# Patient Record
Sex: FEMALE | Race: WHITE | NOT HISPANIC OR LATINO | Employment: OTHER | URBAN - METROPOLITAN AREA
[De-identification: names, ages, dates, MRNs, and addresses within clinical notes are randomized per-mention and may not be internally consistent; named-entity substitution may affect disease eponyms.]

---

## 2017-09-01 ENCOUNTER — HISTORICAL (OUTPATIENT)
Dept: INTERNAL MEDICINE | Facility: CLINIC | Age: 72
End: 2017-09-01

## 2017-09-06 ENCOUNTER — HISTORICAL (OUTPATIENT)
Dept: INTERNAL MEDICINE | Facility: CLINIC | Age: 72
End: 2017-09-06

## 2017-09-06 LAB
ABS NEUT (OLG): 5.18 X10(3)/MCL (ref 2.1–9.2)
ALBUMIN SERPL-MCNC: 3.9 GM/DL (ref 3.4–5)
ALBUMIN/GLOB SERPL: 1 RATIO (ref 1–2)
ALP SERPL-CCNC: 51 UNIT/L (ref 45–117)
ALT SERPL-CCNC: 24 UNIT/L (ref 12–78)
AST SERPL-CCNC: 15 UNIT/L (ref 15–37)
BASOPHILS # BLD AUTO: 0.07 X10(3)/MCL
BASOPHILS NFR BLD AUTO: 1 % (ref 0–1)
BILIRUB SERPL-MCNC: 0.6 MG/DL (ref 0.2–1)
BILIRUBIN DIRECT+TOT PNL SERPL-MCNC: 0.2 MG/DL
BILIRUBIN DIRECT+TOT PNL SERPL-MCNC: 0.4 MG/DL
BUN SERPL-MCNC: 23 MG/DL (ref 7–18)
CALCIUM SERPL-MCNC: 8.8 MG/DL (ref 8.5–10.1)
CHLORIDE SERPL-SCNC: 104 MMOL/L (ref 98–107)
CHOLEST SERPL-MCNC: 242 MG/DL
CHOLEST/HDLC SERPL: 3.8 {RATIO} (ref 0–4.4)
CO2 SERPL-SCNC: 29 MMOL/L (ref 21–32)
COLOR STL: ABNORMAL
CONSISTENCY STL: ABNORMAL
CREAT SERPL-MCNC: 0.6 MG/DL (ref 0.6–1.3)
DEPRECATED CALCIDIOL+CALCIFEROL SERPL-MC: 9.66 NG/ML (ref 30–80)
EOSINOPHIL # BLD AUTO: 0.6 X10(3)/MCL
EOSINOPHIL NFR BLD AUTO: 7 % (ref 0–5)
ERYTHROCYTE [DISTWIDTH] IN BLOOD BY AUTOMATED COUNT: 12.2 % (ref 11.5–14.5)
GLOBULIN SER-MCNC: 3.9 GM/ML (ref 2.3–3.5)
GLUCOSE SERPL-MCNC: 101 MG/DL (ref 74–106)
HCT VFR BLD AUTO: 45.4 % (ref 35–46)
HDLC SERPL-MCNC: 64 MG/DL
HEMOCCULT SP1 STL QL: POSITIVE
HGB BLD-MCNC: 15.5 GM/DL (ref 12–16)
IMM GRANULOCYTES # BLD AUTO: 0.02 10*3/UL
IMM GRANULOCYTES NFR BLD AUTO: 0 %
LDLC SERPL CALC-MCNC: 148 MG/DL (ref 0–130)
LYMPHOCYTES # BLD AUTO: 2.04 X10(3)/MCL
LYMPHOCYTES NFR BLD AUTO: 23 % (ref 15–40)
MCH RBC QN AUTO: 29.9 PG (ref 26–34)
MCHC RBC AUTO-ENTMCNC: 34.1 GM/DL (ref 31–37)
MCV RBC AUTO: 87.5 FL (ref 80–100)
MONOCYTES # BLD AUTO: 0.83 X10(3)/MCL
MONOCYTES NFR BLD AUTO: 10 % (ref 4–12)
NEUTROPHILS # BLD AUTO: 5.18 X10(3)/MCL
NEUTROPHILS NFR BLD AUTO: 59 X10(3)/MCL
PLATELET # BLD AUTO: 384 X10(3)/MCL (ref 130–400)
PMV BLD AUTO: 9 FL (ref 7.4–10.4)
POTASSIUM SERPL-SCNC: 3.5 MMOL/L (ref 3.5–5.1)
PROT SERPL-MCNC: 7.8 GM/DL (ref 6.4–8.2)
RBC # BLD AUTO: 5.19 X10(6)/MCL (ref 4–5.2)
SODIUM SERPL-SCNC: 141 MMOL/L (ref 136–145)
TRIGL SERPL-MCNC: 151 MG/DL
VLDLC SERPL CALC-MCNC: 30 MG/DL
WBC # SPEC AUTO: 8.7 X10(3)/MCL (ref 4.5–11)

## 2017-09-07 LAB
COLOR STL: NORMAL
CONSISTENCY STL: NORMAL
HEMOCCULT SP2 STL QL: NEGATIVE

## 2017-09-08 LAB
COLOR STL: NORMAL
CONSISTENCY STL: NORMAL

## 2017-09-11 ENCOUNTER — HISTORICAL (OUTPATIENT)
Dept: INTERNAL MEDICINE | Facility: CLINIC | Age: 72
End: 2017-09-11

## 2017-12-04 ENCOUNTER — HISTORICAL (OUTPATIENT)
Dept: INTERNAL MEDICINE | Facility: CLINIC | Age: 72
End: 2017-12-04

## 2018-06-22 ENCOUNTER — HISTORICAL (OUTPATIENT)
Dept: ENDOSCOPY | Facility: HOSPITAL | Age: 73
End: 2018-06-22

## 2018-12-06 ENCOUNTER — HISTORICAL (OUTPATIENT)
Dept: ADMINISTRATIVE | Facility: HOSPITAL | Age: 73
End: 2018-12-06

## 2018-12-06 LAB
ABS NEUT (OLG): 5.19 X10(3)/MCL (ref 2.1–9.2)
ALBUMIN SERPL-MCNC: 3.9 GM/DL (ref 3.4–5)
ALBUMIN/GLOB SERPL: 1 RATIO (ref 1–2)
ALP SERPL-CCNC: 58 UNIT/L (ref 45–117)
ALT SERPL-CCNC: 24 UNIT/L (ref 12–78)
AST SERPL-CCNC: 12 UNIT/L (ref 15–37)
BASOPHILS # BLD AUTO: 0.08 X10(3)/MCL
BASOPHILS NFR BLD AUTO: 1 %
BILIRUB SERPL-MCNC: 0.3 MG/DL (ref 0.2–1)
BILIRUBIN DIRECT+TOT PNL SERPL-MCNC: 0.1 MG/DL
BILIRUBIN DIRECT+TOT PNL SERPL-MCNC: 0.2 MG/DL
BUN SERPL-MCNC: 17 MG/DL (ref 7–18)
CALCIUM SERPL-MCNC: 8.7 MG/DL (ref 8.5–10.1)
CHLORIDE SERPL-SCNC: 103 MMOL/L (ref 98–107)
CHOLEST SERPL-MCNC: 226 MG/DL
CHOLEST/HDLC SERPL: 3.7 {RATIO} (ref 0–4.4)
CO2 SERPL-SCNC: 27 MMOL/L (ref 21–32)
CREAT SERPL-MCNC: 0.8 MG/DL (ref 0.6–1.3)
DEPRECATED CALCIDIOL+CALCIFEROL SERPL-MC: 23.01 NG/ML (ref 30–80)
EOSINOPHIL # BLD AUTO: 0.39 10*3/UL
EOSINOPHIL NFR BLD AUTO: 5 %
ERYTHROCYTE [DISTWIDTH] IN BLOOD BY AUTOMATED COUNT: 14.3 % (ref 11.5–14.5)
GLOBULIN SER-MCNC: 4.7 GM/ML (ref 2.3–3.5)
GLUCOSE SERPL-MCNC: 104 MG/DL (ref 74–106)
HCT VFR BLD AUTO: 37 % (ref 35–46)
HDLC SERPL-MCNC: 61 MG/DL
HGB BLD-MCNC: 11.2 GM/DL (ref 12–16)
IMM GRANULOCYTES # BLD AUTO: 0.02 10*3/UL
IMM GRANULOCYTES NFR BLD AUTO: 0 %
LDLC SERPL CALC-MCNC: 139 MG/DL (ref 0–130)
LYMPHOCYTES # BLD AUTO: 1.75 X10(3)/MCL
LYMPHOCYTES NFR BLD AUTO: 21 % (ref 13–40)
MCH RBC QN AUTO: 23.8 PG (ref 26–34)
MCHC RBC AUTO-ENTMCNC: 30.3 GM/DL (ref 31–37)
MCV RBC AUTO: 78.6 FL (ref 80–100)
MONOCYTES # BLD AUTO: 0.78 X10(3)/MCL
MONOCYTES NFR BLD AUTO: 10 % (ref 4–12)
NEUTROPHILS # BLD AUTO: 5.19 X10(3)/MCL
NEUTROPHILS NFR BLD AUTO: 63 X10(3)/MCL
PLATELET # BLD AUTO: 494 X10(3)/MCL (ref 130–400)
PMV BLD AUTO: 9.5 FL (ref 7.4–10.4)
POTASSIUM SERPL-SCNC: 3.9 MMOL/L (ref 3.5–5.1)
PROT SERPL-MCNC: 8.6 GM/DL (ref 6.4–8.2)
RBC # BLD AUTO: 4.71 X10(6)/MCL (ref 4–5.2)
SODIUM SERPL-SCNC: 138 MMOL/L (ref 136–145)
TRIGL SERPL-MCNC: 132 MG/DL
VLDLC SERPL CALC-MCNC: 26 MG/DL
WBC # SPEC AUTO: 8.2 X10(3)/MCL (ref 4.5–11)

## 2018-12-17 ENCOUNTER — HISTORICAL (OUTPATIENT)
Dept: WOUND CARE | Facility: HOSPITAL | Age: 73
End: 2018-12-17

## 2019-05-01 ENCOUNTER — HISTORICAL (OUTPATIENT)
Dept: PREADMISSION TESTING | Facility: HOSPITAL | Age: 74
End: 2019-05-01

## 2019-05-01 ENCOUNTER — HISTORICAL (OUTPATIENT)
Dept: LAB | Facility: HOSPITAL | Age: 74
End: 2019-05-01

## 2019-05-01 LAB
BUN SERPL-MCNC: 18 MG/DL (ref 7–18)
CALCIUM SERPL-MCNC: 8.8 MG/DL (ref 8.5–10.1)
CHLORIDE SERPL-SCNC: 105 MMOL/L (ref 98–107)
CO2 SERPL-SCNC: 28 MMOL/L (ref 21–32)
CREAT SERPL-MCNC: 0.56 MG/DL (ref 0.55–1.02)
CREAT/UREA NIT SERPL: 32.1
DEPRECATED CALCIDIOL+CALCIFEROL SERPL-MC: 16 NG/ML (ref 30–80)
GLUCOSE SERPL-MCNC: 85 MG/DL (ref 74–106)
POTASSIUM SERPL-SCNC: 3.9 MMOL/L (ref 3.5–5.1)
SODIUM SERPL-SCNC: 138 MMOL/L (ref 136–145)

## 2019-05-10 ENCOUNTER — HISTORICAL (OUTPATIENT)
Dept: SURGERY | Facility: HOSPITAL | Age: 74
End: 2019-05-10

## 2019-06-25 ENCOUNTER — HISTORICAL (OUTPATIENT)
Dept: RADIOLOGY | Facility: HOSPITAL | Age: 74
End: 2019-06-25

## 2019-12-05 ENCOUNTER — HISTORICAL (OUTPATIENT)
Dept: ADMINISTRATIVE | Facility: HOSPITAL | Age: 74
End: 2019-12-05

## 2019-12-05 LAB
ABS NEUT (OLG): 7.98 X10(3)/MCL (ref 2.1–9.2)
ALBUMIN SERPL-MCNC: 3.9 GM/DL (ref 3.4–5)
ALBUMIN/GLOB SERPL: 1 RATIO (ref 1.1–2)
ALP SERPL-CCNC: 51 UNIT/L (ref 45–117)
ALT SERPL-CCNC: 18 UNIT/L (ref 12–78)
AST SERPL-CCNC: 14 UNIT/L (ref 15–37)
BASOPHILS # BLD AUTO: 0.1 X10(3)/MCL (ref 0–0.2)
BASOPHILS NFR BLD AUTO: 1 %
BILIRUB SERPL-MCNC: 0.4 MG/DL (ref 0.2–1)
BILIRUBIN DIRECT+TOT PNL SERPL-MCNC: <0.1 MG/DL (ref 0–0.2)
BILIRUBIN DIRECT+TOT PNL SERPL-MCNC: >0.3 MG/DL
BUN SERPL-MCNC: 17 MG/DL (ref 7–18)
CALCIUM SERPL-MCNC: 9 MG/DL (ref 8.5–10.1)
CHLORIDE SERPL-SCNC: 104 MMOL/L (ref 98–107)
CO2 SERPL-SCNC: 30 MMOL/L (ref 21–32)
CREAT SERPL-MCNC: 0.7 MG/DL (ref 0.6–1.3)
EOSINOPHIL # BLD AUTO: 0.2 X10(3)/MCL (ref 0–0.9)
EOSINOPHIL NFR BLD AUTO: 2 %
ERYTHROCYTE [DISTWIDTH] IN BLOOD BY AUTOMATED COUNT: 15.1 % (ref 11.5–14.5)
EST. AVERAGE GLUCOSE BLD GHB EST-MCNC: 131 MG/DL
GLOBULIN SER-MCNC: 3.9 GM/ML (ref 2.3–3.5)
GLUCOSE SERPL-MCNC: 113 MG/DL (ref 74–106)
HBA1C MFR BLD: 6.2 % (ref 4.2–6.3)
HCT VFR BLD AUTO: 43.1 % (ref 35–46)
HGB BLD-MCNC: 13.4 GM/DL (ref 12–16)
IMM GRANULOCYTES # BLD AUTO: 0.04 10*3/UL
IMM GRANULOCYTES NFR BLD AUTO: 0 %
LYMPHOCYTES # BLD AUTO: 1.7 X10(3)/MCL (ref 0.6–4.6)
LYMPHOCYTES NFR BLD AUTO: 16 %
MCH RBC QN AUTO: 26.4 PG (ref 26–34)
MCHC RBC AUTO-ENTMCNC: 31.1 GM/DL (ref 31–37)
MCV RBC AUTO: 85 FL (ref 80–100)
MONOCYTES # BLD AUTO: 0.8 X10(3)/MCL (ref 0.1–1.3)
MONOCYTES NFR BLD AUTO: 7 %
NEUTROPHILS # BLD AUTO: 7.98 X10(3)/MCL (ref 2.1–9.2)
NEUTROPHILS NFR BLD AUTO: 74 %
PLATELET # BLD AUTO: 420 X10(3)/MCL (ref 130–400)
PMV BLD AUTO: 9.7 FL (ref 7.4–10.4)
POTASSIUM SERPL-SCNC: 3.5 MMOL/L (ref 3.5–5.1)
PROT SERPL-MCNC: 7.8 GM/DL (ref 6.4–8.2)
RBC # BLD AUTO: 5.07 X10(6)/MCL (ref 4–5.2)
SODIUM SERPL-SCNC: 140 MMOL/L (ref 136–145)
WBC # SPEC AUTO: 10.8 X10(3)/MCL (ref 4.5–11)

## 2019-12-11 ENCOUNTER — HISTORICAL (OUTPATIENT)
Dept: INTERNAL MEDICINE | Facility: CLINIC | Age: 74
End: 2019-12-11

## 2021-02-23 ENCOUNTER — HISTORICAL (OUTPATIENT)
Dept: INTERNAL MEDICINE | Facility: CLINIC | Age: 76
End: 2021-02-23

## 2021-02-23 LAB
ABS NEUT (OLG): 6.3 X10(3)/MCL (ref 2.1–9.2)
ALBUMIN SERPL-MCNC: 3.9 GM/DL (ref 3.4–4.8)
ALBUMIN/GLOB SERPL: 1 RATIO (ref 1.1–2)
ALP SERPL-CCNC: 56 UNIT/L (ref 40–150)
ALT SERPL-CCNC: 14 UNIT/L (ref 0–55)
AST SERPL-CCNC: 15 UNIT/L (ref 5–34)
BASOPHILS # BLD AUTO: 0.1 X10(3)/MCL (ref 0–0.2)
BASOPHILS NFR BLD AUTO: 1 %
BILIRUB SERPL-MCNC: 0.5 MG/DL
BILIRUBIN DIRECT+TOT PNL SERPL-MCNC: 0.2 MG/DL (ref 0–0.5)
BILIRUBIN DIRECT+TOT PNL SERPL-MCNC: 0.3 MG/DL (ref 0–0.8)
BUN SERPL-MCNC: 23.8 MG/DL (ref 9.8–20.1)
CALCIUM SERPL-MCNC: 9.1 MG/DL (ref 8.4–10.2)
CHLORIDE SERPL-SCNC: 102 MMOL/L (ref 98–107)
CHOLEST SERPL-MCNC: 234 MG/DL
CHOLEST/HDLC SERPL: 4 {RATIO} (ref 0–5)
CO2 SERPL-SCNC: 29 MMOL/L (ref 23–31)
CREAT SERPL-MCNC: 0.78 MG/DL (ref 0.55–1.02)
DEPRECATED CALCIDIOL+CALCIFEROL SERPL-MC: 81.2 NG/ML (ref 30–80)
EOSINOPHIL # BLD AUTO: 0.4 X10(3)/MCL (ref 0–0.9)
EOSINOPHIL NFR BLD AUTO: 4 %
ERYTHROCYTE [DISTWIDTH] IN BLOOD BY AUTOMATED COUNT: 12.3 % (ref 11.5–14.5)
EST. AVERAGE GLUCOSE BLD GHB EST-MCNC: 108.3 MG/DL
GLOBULIN SER-MCNC: 3.8 GM/DL (ref 2.4–3.5)
GLUCOSE SERPL-MCNC: 103 MG/DL (ref 82–115)
HBA1C MFR BLD: 5.4 %
HCT VFR BLD AUTO: 45.6 % (ref 35–46)
HDLC SERPL-MCNC: 54 MG/DL (ref 35–60)
HGB BLD-MCNC: 14.8 GM/DL (ref 12–16)
IMM GRANULOCYTES # BLD AUTO: 0.02 10*3/UL
IMM GRANULOCYTES NFR BLD AUTO: 0 %
LDLC SERPL CALC-MCNC: 150 MG/DL (ref 50–140)
LYMPHOCYTES # BLD AUTO: 2 X10(3)/MCL (ref 0.6–4.6)
LYMPHOCYTES NFR BLD AUTO: 21 %
MCH RBC QN AUTO: 29 PG (ref 26–34)
MCHC RBC AUTO-ENTMCNC: 32.5 GM/DL (ref 31–37)
MCV RBC AUTO: 89.2 FL (ref 80–100)
MONOCYTES # BLD AUTO: 0.7 X10(3)/MCL (ref 0.1–1.3)
MONOCYTES NFR BLD AUTO: 7 %
NEUTROPHILS # BLD AUTO: 6.3 X10(3)/MCL (ref 2.1–9.2)
NEUTROPHILS NFR BLD AUTO: 66 %
PLATELET # BLD AUTO: 376 X10(3)/MCL (ref 130–400)
PMV BLD AUTO: 9.2 FL (ref 7.4–10.4)
POTASSIUM SERPL-SCNC: 3.6 MMOL/L (ref 3.5–5.1)
PROT SERPL-MCNC: 7.7 GM/DL (ref 5.8–7.6)
RBC # BLD AUTO: 5.11 X10(6)/MCL (ref 4–5.2)
SODIUM SERPL-SCNC: 141 MMOL/L (ref 136–145)
TRIGL SERPL-MCNC: 148 MG/DL (ref 37–140)
TSH SERPL-ACNC: 1.81 UIU/ML (ref 0.35–4.94)
VIT B12 SERPL-MCNC: 516 PG/ML (ref 213–816)
VLDLC SERPL CALC-MCNC: 30 MG/DL
WBC # SPEC AUTO: 9.5 X10(3)/MCL (ref 4.5–11)

## 2021-06-29 ENCOUNTER — HISTORICAL (OUTPATIENT)
Dept: RADIOLOGY | Facility: HOSPITAL | Age: 76
End: 2021-06-29

## 2021-08-12 LAB
BUN SERPL-MCNC: 15 MG/DL (ref 9.8–20.1)
CALCIUM SERPL-MCNC: 9.4 MG/DL (ref 8.4–10.2)
CHLORIDE SERPL-SCNC: 101 MMOL/L (ref 98–107)
CO2 SERPL-SCNC: 27 MMOL/L (ref 23–31)
CREAT SERPL-MCNC: 0.68 MG/DL (ref 0.55–1.02)
CREAT/UREA NIT SERPL: 22
GLUCOSE SERPL-MCNC: 84 MG/DL (ref 82–115)
POTASSIUM SERPL-SCNC: 3.8 MMOL/L (ref 3.5–5.1)
SARS-COV-2 AG RESP QL IA.RAPID: NEGATIVE
SODIUM SERPL-SCNC: 140 MMOL/L (ref 136–145)

## 2021-08-13 ENCOUNTER — HISTORICAL (OUTPATIENT)
Dept: SURGERY | Facility: HOSPITAL | Age: 76
End: 2021-08-13

## 2022-04-10 ENCOUNTER — HISTORICAL (OUTPATIENT)
Dept: ADMINISTRATIVE | Facility: HOSPITAL | Age: 77
End: 2022-04-10
Payer: MEDICARE

## 2022-04-28 VITALS
DIASTOLIC BLOOD PRESSURE: 68 MMHG | WEIGHT: 117.75 LBS | SYSTOLIC BLOOD PRESSURE: 136 MMHG | OXYGEN SATURATION: 96 % | HEIGHT: 62 IN | BODY MASS INDEX: 21.67 KG/M2

## 2022-04-30 NOTE — OP NOTE
Patient:   Tanja Cohen            MRN: [id number]            FIN: [id number]               Age:   72 years     Sex:  Female     :  1945   Associated Diagnoses:   None   Author:   Michelle Farnsworth MD      [facility] Operative Report    Date of Procedure: 2018    Staff: Dr. Oscar Vega MD    Resident: Dr. Michelle Farnsworth MD    Preoperative Diagnosis: +FOBT; hx of colon polyps    Postoperative Diagnosis: same; normal colonoscopy    Procedure: Screening Colonoscopy    Anesthesia: Moderate sedation    Indications: This is a 71 y/o F w/ hx of colon polyps and +FOBT who presents as an outpatient for colonoscopy.   The risks, benefits, indications, and alternatives of the procedure and sedation were discussed and the patient agreed to the procedure.    Procedure in detail: After informed consent was obtained, the patient was rolled into the endo suite and placed in the L lateral decubitus position.  Proper time out was performed.  The patient was administered sedation over the course of the procedure.  After appropriate sedation was achieved, rectal exam was performed, w/ no massed palpated, good rectal tone, and no gross blood.  The colonoscope was the inserted through the anus and advanced through the sigmoid colon. The scope was advanced around the colon until arriving to the ileocecal valve.  The appendiceal orifice and the ileocecal valve were both noted.  No masses, lesions, erythema, bleeding, or other abnormalities were noted in the cecum.  The scope was then withdrawn w/ over 6 minutes of time taken to inspect the colon on withdrawal.  No masses, lesions, erythema, bleeding, or other abnormalities were noted along the ascending, transverse, or descending colon.  The sigmoid and rectum also appeared normal.  The scope was then withdrawn and the patient awoken from anesthesia and brought to recovery.  The patient tolerated the procedure well and would return to the PACU in stable condition.       Patient condition: stable    Complications: none    Estimated blood loss: minimal    Findings: normal colonoscopy    Specimens: none    Plan: repeat colonoscopy in 5 years due to hx of colon polyps

## 2022-04-30 NOTE — OP NOTE
* Final Report *  Preoperative Diagnosis Hallux valgus, left foot Postoperative Diagnosis same Operation Hallux valgus correction with DMO, left   Surgeon(s) Jacobo Walls DPM   Assistant none Anesthesia general + ankle block Estimated Blood Loss minimal Urine Output none Findings mild arthrosis at plantar 1st MTPJ Specimen(s) none Complications none Technique The patient was brought to the operating room on a stretcher and placed on the operating table in a supine position. An ankle block consisting of 30 cc of 0.5% marcaine plain was performed. Following the successful induction of MAC anesthesia, a well padded ankle tourniquet was applied. Following this the foot was scrubbed, prepped and draped in the usual aseptic manner. A time out was performed and an esmark was used to exsanguinate the foot. The tourniquet was inflated to 250mmHg.   The first procedure was bunionectomy with distal metatarsal osteotomy. Attention was directed to the medial aspect of the 1st metatarsal where a #15 blade was used to perform a medial longitudinal incision over the distal half of the 1st metatarsal. Anatomic dissection was performed through subcutaneous tissues utilizing both sharp and blunt dissection with care was taken to identify and retract all major neurovascular structures. All bleeders were cauterized as needed. The 1st metatarsophalangeal joint capsule was identified medially and a medial longitudinal capsulotomy was performed. The first metatarsal was exposed medially and dorsally with care to preserve vital nutrient vessels and periosteum. The MTPJ cartilage was intact and viable except for a small area of cartilage loss and arthrosis at the plantar 15% of the joint.  The sesamoids and there metatarsal articulation was observed with some noted loss of cartilage at both sesamoids. Through this medial approach, the lateral sesamoid suspensory ligament was released and a partial transverse capsulotomy at the MTPJ was  performed with a Tejon blade.   The dorso-medial bony eminence at the head of the 1st metatarsal was resected with a sagittal saw. Care was taken to preserve the underlying sesamoidal irene and groove. A 1.1 mm axis guide wire was placed at the central metatarsal head in a trajectory to achieve slight plantarflexion of the capital fragment. A distal chevron osteotomy with a longer plantar arm was then performed from medial to lateral. The capital fragment was translated laterally on the surgical neck of the 1st metatarsal 4 mm and manually compressed. A lobster clamp was used to maintain reduction across the osteotomy while a c-arm was used to check the reduction. Osteotomy fixation was then achieved with a 2.5 mm fully threaded, headless cannulated, compression screws over guidewires using standard AO principles with excellent compression noted. Fixation and joint alignment was viewed on c-arm with noted reduction of 1-2 IM, hallux valgus angles and sesamoids position. The prominent, redundant distal medial metatarsal ledge was excised with the sagittal saw. The incision was copiously irrigated with normal saline.   The joint capsule and subcutaneous tissues were closed with 2-0 and 4-0 vicryl, respectively. The skin was closed with 4-0 nylon. A sterile compression dressing was applied to the surgical site with xeroform, 4x4 gauze, cling, cast padding, and ACE. The tourniquet was released and capillary refill time < 3 seconds was noted to all digits. A short fracture boot was applied to the foot.  The patient tolerated the procedure and anesthesia well and was transferred to the recovery room with vital signs stable and vascular status intact. Following a period of post op monitoring, the patient will be discharged home with all post op instructions and prescriptions. Patient scheduled for follow-up in 1 week.          Result type: Procedure Note   Result date: May 10, 2019 10:44 CDT   Result status: Auth  (Verified)   Result title: Op Note   Performed by: Jacobo Walls DPM on May 10, 2019 10:46 CDT   Verified by: Jacobo Walls DPM on May 26, 2019 23:00 CDT   Encounter info: [id number], [facility], Prereg     Moved by HIM

## 2022-05-03 NOTE — HISTORICAL [facility] CERNER
This is a historical note converted from [employer]. Formatting and pictures may have been removed.  Please reference [employer]in for original formatting and attached multimedia. Preoperative Diagnosis  Hallux valgus deformity, right  Postoperative Diagnosis  Same  Operation  HAV correction?with double osteotomy, raymond  Surgeon(s)  Jacobo Walls D.P.M.  Assistant  None  Anesthesia  General  Estimated Blood Loss  Minimal  Findings  Intact cartilage?  Specimen(s)  None  Complications  None  Technique  Patient brought into the operating room placed on the operative table in the supine position.??Following induction of?sedation right ankle block was performed with a?0.5% Marcaine plain 1% lidocaine plain.??Right?lower extremity was prepped and draped in the usual sterile fashion.??Timeout was performed.??The extremity was?exsanguinated with Esmarch bandage and tourniquet inflated to 250 mmHg.  ?  Attention focused to the right forefoot where a medial?incision was performed at the first metatarsal phalangeal.??Dissection was carried down through subcutaneous tissues?retracting protecting neurovascular structures.??Hemostasis achieved with electrocautery.??A medial capsulotomy was performed exposing the joint structure.??Intact cartilage noted at the first metatarsophalangeal joint.??A lateral release was performed?as well as partial lateral capsulotomy.??Sagittal saw was used to?resect the medial eminence, osteophyte of the first metatarsal head.??A chevron osteotomy was performed?and capital fragment translated approximately 4 mm laterally?and then fixated with a 2.5 mm cannulated screw.??Excellent compression alignment achieved at the metatarsal head.??Second osteotomy was then performed at the?proximal phalanx with closing wedge?Akin?procedure.??This was fixated with a?1.1 mm K wire.??This point excellent alignment was noted at the IP joint and MTP joint.??The surgical sites were irrigated with saline and closed in layered  fashion?with Vicryl at the deep?fascia and nylon at the skin and subcutaneous layer.??Fluoroscopy was used to?confirm joint?alignment as well as hardware placement.??A compression dressing was applied.??The foot was?immobilized with a cam boot.??Patient tolerated procedure and anesthesia well vital signs stable throughout.??She was transferred to the PACU and recovered well.??She be discharged home with all postoperative instructions and prescriptions.??She will follow-up in 1 week as?scheduled.  ?  ?

## 2022-05-03 NOTE — HISTORICAL [facility] CERNER
This is a historical note converted from Mahin. Formatting and pictures may have been removed.  Please reference Mahin for original formatting and attached multimedia. Chief Complaint  follow up; rx refills  History of Present Illness  this is a 72-year-old pleasant female with past medical history of hypertension, GERD, vagina atrophy and dyslipidemia presents to medicine clinic for a follow-up visit. ?She denies any complaints, reports 100% compliance with her medications. ?Possession she takes half pill of head CT Z lisinopril?daily, her blood pressure today in clinic is 108/63, denies any dizziness or lightheadedness.? We tried to?discontinue her blood pressure medication in the past but her?blood pressure went as high as 163/110 and hence placed her on very low-dose. ?Reports that her blood pressure fluctuates throughout the day?usually in the 110 over?60s in the morning and then?gradually?goes up?to as high as 140s over?90?by the end of the day. ?Denies any other complaints  Review of Systems  All systems reviewed and are negative except HPI  Physical Exam  Vitals & Measurements  T:?36.3? ?C (Oral)? HR:?71(Peripheral)? RR:?20? BP:?108/63?  HT:?157?cm? HT:?157?cm? WT:?53.7?kg? WT:?53.7?kg? BMI:?21.79?  General: AAOx3, NAD, alert and cooperative  HEENT: PERRLA, EOMI, normal conjunctiva  Neck: no LAD, no JVD, supple, no masses or thyromegaly  CVS: S1/S2 nml, RRR, no murmurs, rubs or gallops  Resp: CTA B/L, no rhonchi, rales, or wheezing  GI: Not distended, not tender, BS+, no guarding  Skin: not jaundiced, warm, no rashes  Extremities: no peripheral edema, peripheral pulses intact  Neuro: CN II-XII grossly intact, strength and sensation symmetric and intact throughout, no focal neurological deficits.  Assessment/Plan  Hypertension  Takes 0.5 tab of HCTZ-lisinopril 25-20 mg daily  Regularly checks her blood pressure at home?and is usually well controlled. ?Blood pressure tends to be in?low 100 and the  morning?but as the day goes by it tends to go up to as high as 140s ( SBP )  Denies any dizziness or lightheadedness  ?  Vitamin D deficiency  On vitamin D supplements  Continue  Vitamin D level today  ?  Dyslipidemia  Patient refuses to be placed on any statins  ?  Prevention  Refuses DEXA  Flu shot and pneumonia vaccine up-to-date  ?   Problem List/Past Medical History  Ongoing  Acid reflux  Hypertension  Vaginal dryness  Historical  No qualifying data  Procedure/Surgical History  Colonoscopy (06/22/2018)  None   Medications  hydrochlorothiazide-lisinopril 25 mg-20 mg oral tablet, 1 tab(s), Oral, Daily, 6 refills  MiraLax oral powder for reconstitution, 17 gm, Oral, Daily, 5 refills,? ?Still taking, not as prescribed: PRN  omeprazole 20 mg oral DR capsule, 20 mg= 1 cap(s), Oral, Daily, 6 refills  Premarin 0.625 mg/g vaginal cream with applicator, See Instructions, 5 refills  Allergies  No Known Medication Allergies  Social History  Alcohol - Denies Alcohol Use, 05/21/2015  Never, 03/10/2016  Employment/School  Employed, Work/School description: [employer]., 05/21/2015  Exercise  Exercise frequency: 5-6 times/week. Exercise type: Walking, Weight lifting., 03/10/2016  Exercise frequency: 5-6 times/week., 03/10/2016  Home/Environment  Lives with Alone. Living situation: Home/Independent. Alcohol abuse in household: No. Substance abuse in household: No. Smoker in household: No. Injuries/Abuse/Neglect in household: No. Feels unsafe at home: No., 05/21/2015  Nutrition/Health  Regular, 03/10/2016  Substance Abuse - Denies Substance Abuse, 05/21/2015  Never, 03/10/2016  Tobacco - Denies Tobacco Use, 05/21/2015  Never smoker, N/A, 12/06/2018  Never smoker, 03/10/2016  Family History  Renal failure syndrome.: Father.  Health Maintenance  Health Maintenance  ???Pending?(in the next year)  ??? ??OverDue  ??? ? ? ?Geriatric Depression Screening due??11/02/17??and every 1??year(s)  ??? ? ? ?Hypertension Management-BMP  due??09/06/18??and every 1??year(s)  ??? ??Due?  ??? ? ? ?ADL Screening due??12/06/18??and every 1??year(s)  ??? ? ? ?Alcohol Misuse Screening due??12/06/18??and every 1??year(s)  ??? ? ? ?Aspirin Therapy for CVD Prevention due??12/06/18??and every 1??year(s)  ??? ? ? ?Bone Density Screening due??12/06/18??Variable frequency  ??? ? ? ?Cognitive Screening due??12/06/18??and every 1??year(s)  ??? ? ? ?Functional Assessment due??12/06/18??and every 1??year(s)  ??? ? ? ?Pneumococcal Vaccine due??12/06/18??Variable frequency  ??? ? ? ?Pneumococcal Vaccine due??12/06/18??and every?  ??? ? ? ?Tetanus Vaccine due??12/06/18??and every 10??year(s)  ??? ? ? ?Zoster Vaccine due??12/06/18??and every 100??year(s)  ??? ??Due In Future?  ??? ? ? ?Advance Directive not due until??09/10/19??and every 1??year(s)  ??? ? ? ?Breast Cancer Screening ([facility]) not due until??12/04/19??and every 2??year(s)  ???Satisfied?(in the past 1 year)  ??? ??Satisfied?  ??? ? ? ?Advance Directive on??09/10/18.??Satisfied by Nhi Hawk RN  ??? ? ? ?Blood Pressure Screening on??12/06/18.??Satisfied by Gabbie Lynn LPN  ??? ? ? ?Body Mass Index Check on??12/06/18.??Satisfied by Gabbie Lynn LPN  ??? ? ? ?Colorectal Screening ([facility]) on??06/22/18.  ??? ? ? ?Depression Screening on??09/10/18.??Satisfied by Nhi Hawk RN  ??? ? ? ?Fall Risk Assessment on??12/06/18.??Satisfied by Gabbie Lynn LPN  ??? ? ? ?Hypertension Management-Blood Pressure on??12/06/18.??Satisfied by Gabbie Lynn LPN  ??? ? ? ?Obesity Screening on??12/06/18.??Satisfied by Gabbie Lynn LPN  ??? ? ? ?Smoking Cessation (Coronary Artery Disease) on??06/22/18.??Satisfied by Nancy KIM, Chana PARDO  ?  ?      Patient discussed in clinic with medicine resident  Care provided today is appropriate  Agree with above assessment and plan

## 2022-05-03 NOTE — HISTORICAL [facility] CERNER
This is a historical note converted from [employer]. Formatting and pictures may have been removed.  Please reference [employer] for original formatting and attached multimedia. History of Present Illness  this is a 72-year-old pleasant female with past medical history of hypertension, GERD, Hallux valgus deformity, vagina atrophy and dyslipidemia presents to medicine clinic for a follow-up visit.?Patient underwent procedure for correction of hallux valgus deformity without complication. Patient followed in GYN clinic and was recently seen for annual wellness visit. Patient also recently visited urgent care for bug bit and urticarial reaction, which have resolved, pt currently asymptomatic. Patient complains of vaginal itching today.?She denies any further complaints at this time and reports 100% compliance with her medications.  Review of Systems  negative unless stated in HPI  Physical Exam  General:?well-developed well-nourished in no acute distress  Eye:?clear conjunctiva, eyelids normal  HENT:?oropharynx without erythema/exudate, oropharynx and nasal mucosal surfaces moist  Neck: full range of motion, no thyromegaly or lymphadenopathy  Respiratory:?clear to auscultation bilaterally, no C/W/R heard  Cardiovascular:?regular rate and rhythm without murmurs, no lower extremity edema  Gastrointestinal:?soft, non-tender, non-distended with normal bowel sounds, without masses to palpation  Musculoskeletal:?full range of motion of all extremities/spine without limitation or discomfort  Integumentary: no rashes or skin lesions present  Neurologic:?no signs of peripheral neurological deficit, motor/sensory function intact  Assessment/Plan  Hypertension  BP elevated at todays visit  if continues to be high, will adjust medication  continue current medications  will maintain BP log and bring to next visit  ?   Vitamin D deficiency  On vitamin D supplements  Continue  ?   Dyslipidemia  Patient refuses to be placed on any  statins  ?   GERD  currently on PPI therapy  symptoms controlled  continue to monitor  ?   Vaginal Atrophy  f/u in GYN clinic  continue topical estrogen cream  ?   Vaginitis  complaining of vaginal itching today  likely secondary to candidal vaginitis  prescribed fluconazole 150 mg po  ?   Hallux valgus deformity  recently underwent procedure for correction  reports symptomatic improvement today  ?   Prevention  Refused DEXA previously, will reorder today  CBC/CMP/HBA1C today  FIT test today  Flu shot and pneumonia vaccine up-to-date  Etswmdawd-um-ue-date and normal  Return to clinic in 4 months   Problem List/Past Medical History  Ongoing  Acid reflux  Hallux valgus  Hypertension  Vaginal dryness  Historical  Pregnant  Pregnant  Procedure/Surgical History  ;eft bunion surgery (05/10/2019)  Correction, hallux valgus (bunionectomy), with sesamoidectomy, when performed; with distal metatarsal osteotomy, any method (05/10/2019)  Hallux Valgus Correction (Left) (05/10/2019)  Reposition Left Metatarsal with Internal Fixation Device, Open Approach (05/10/2019)  Colonoscopy (06/22/2018)  Colonoscopy, flexible; diagnostic, including collection of specimen(s) by brushing or washing, when performed (separate procedure) (06/22/2018)  Inspection of Lower Intestinal Tract, Via Natural or Artificial Opening Endoscopic (06/22/2018)  oral surgery   Medications  acetaminophen 325 mg oral capsule, 325 mg= 1 cap(s), Oral, q4hr, PRN  aspirin 81 mg oral tablet, 81 mg= 1 tab(s), Oral, At Bedtime  hydrochlorothiazide-lisinopril 25 mg-20 mg oral tablet, 1 tab(s), Oral, Daily, 1 refills,? ?Still taking, not as prescribed: sometimes takes half a pill, takes b/p at [facility]  hydrOXYzine hydrochloride 25 mg oral tablet, Oral, QID  methylPREDNISolone 4 mg oral tab,? ?Not taking  mupirocin 2% topical ointment,? ?Not taking  omeprazole 20 mg oral DR capsule, 20 mg= 1 cap(s), Oral, Daily, 6 refills  prednisONE 10 mg oral tablet, 30 mg= 3 tab(s), Oral,  Daily  Premarin 0.625 mg/g vaginal cream with applicator, See Instructions, 5 refills  Allergies  cephalexin?(hives)  Social History  Alcohol - Denies Alcohol Use, 05/21/2015  Never, 05/03/2019  Employment/School  Unemployed, 04/25/2019  Exercise  Exercise frequency: 5-6 times/week. Exercise type: Walking, Weight lifting., 03/10/2016  Home/Environment  Lives with Alone. Living situation: Home/Independent. Alcohol abuse in household: No. Substance abuse in household: No. Smoker in household: No. Injuries/Abuse/Neglect in household: No. Feels unsafe at home: No., 05/21/2015  Nutrition/Health  Regular, 03/10/2016  Sexual  Sexually active: No., 06/12/2019  Substance Use - Denies Substance Abuse, 05/21/2015  Never, 05/03/2019  Tobacco - Denies Tobacco Use, 05/21/2015  Never (less than 100 in lifetime), N/A, 06/12/2019  Family History  Alcoholism.: Mother.  Renal failure syndrome.: Father.  Immunizations  Vaccine Date Status   influenza virus vaccine, inactivated 09/22/2019 Recorded   influenza virus vaccine, live, trivalent 09/25/2018 Recorded   tetanus/diphth/pertuss (Tdap) adult/adol 05/19/2018 Recorded   tetanus/diphtheria/pertussis, acel(Tdap) 05/19/2018 Recorded   pneumococcal 13-valent conjugate vaccine 09/11/2017 Recorded   influenza virus vaccine, inactivated 09/11/2017 Recorded   influenza virus vaccine, inactivated 09/12/2016 Recorded   influenza virus vaccine, inactivated 09/15/2015 Recorded   influenza virus vaccine, inactivated 10/27/2014 Recorded   influenza virus vaccine, inactivated 12/15/2013 Recorded   zoster vaccine live 07/22/2013 Recorded   Health Maintenance  Health Maintenance  ???Pending?(in the next year)  ??? ??OverDue  ??? ? ? ?Pneumococcal Vaccine due??and every?  ??? ? ? ?Advance Directive due??01/01/19??and every 1??year(s)  ??? ? ? ?Alcohol Misuse Screening due??01/01/19??and every 1??year(s)  ??? ??Due?  ??? ? ? ?Bone Density Screening due??12/05/19??Variable frequency  ??? ??Due In  Future?  ??? ? ? ?Cognitive Screening not due until??01/01/20??and every 1??year(s)  ??? ? ? ?Fall Risk Assessment not due until??01/01/20??and every 1??year(s)  ??? ? ? ?Functional Assessment not due until??01/01/20??and every 1??year(s)  ??? ? ? ?Geriatric Depression Screening not due until??01/01/20??and every 1??year(s)  ??? ? ? ?Obesity Screening not due until??01/01/20??and every 1??year(s)  ??? ? ? ?ADL Screening not due until??04/25/20??and every 1??year(s)  ??? ? ? ?Hypertension Management-BMP not due until??04/30/20??and every 1??year(s)  ??? ? ? ?Aspirin Therapy for CVD Prevention not due until??05/01/20??and every 1??year(s)  ??? ? ? ?Hypertension Management-Blood Pressure not due until??06/11/20??and every 1??year(s)  ???Satisfied?(in the past 1 year)  ??? ??Satisfied?  ??? ? ? ?ADL Screening on??04/25/19.??Satisfied by Hector Regan RN  ??? ? ? ?Aspirin Therapy for CVD Prevention on??05/01/19.  ??? ? ? ?Blood Pressure Screening on??06/12/19.??Satisfied by Adelaide Jaquez RN  ??? ? ? ?Body Mass Index Check on??06/12/19.??Satisfied by Adelaide Jaquez RN  ??? ? ? ?Breast Cancer Screening ([facility]) on??12/17/18.??Satisfied by Brie Sexton  ??? ? ? ?Cognitive Screening on??04/25/19.??Satisfied by Hector Regan RN  ??? ? ? ?Depression Screening on??06/12/19.??Satisfied by Adelaide Jaquez RN  ??? ? ? ?Diabetes Screening on??05/01/19.??Satisfied by Radha Matrin  ??? ? ? ?Fall Risk Assessment on??06/12/19.??Satisfied by Adelaide Jaquez RN  ??? ? ? ?Functional Assessment on??04/25/19.??Satisfied by Hector Regan RN  ??? ? ? ?Geriatric Depression Screening on??04/25/19.??Satisfied by Hcetor Regan RN  ??? ? ? ?Hypertension Management-Blood Pressure on??06/12/19.??Satisfied by Adelaide Jaquez RN  ??? ? ? ?Influenza Vaccine on??09/22/19.??Satisfied by Temitope Root LPN  ??? ? ? ?Lipid Screening on??12/06/18.??Satisfied by  Aimee Alves  ??? ? ? ?Obesity Screening on??06/12/19.??Satisfied by Gisele KIM, Adelaide Suero  ?

## 2022-05-10 ENCOUNTER — OFFICE VISIT (OUTPATIENT)
Dept: URGENT CARE | Facility: CLINIC | Age: 77
End: 2022-05-10
Payer: MEDICARE

## 2022-05-10 VITALS
BODY MASS INDEX: 19.32 KG/M2 | TEMPERATURE: 98 F | HEIGHT: 62 IN | WEIGHT: 105 LBS | RESPIRATION RATE: 16 BRPM | SYSTOLIC BLOOD PRESSURE: 127 MMHG | HEART RATE: 65 BPM | OXYGEN SATURATION: 97 % | DIASTOLIC BLOOD PRESSURE: 67 MMHG

## 2022-05-10 DIAGNOSIS — J20.9 ACUTE BRONCHITIS, UNSPECIFIED ORGANISM: Primary | ICD-10-CM

## 2022-05-10 PROCEDURE — 99213 PR OFFICE/OUTPT VISIT, EST, LEVL III, 20-29 MIN: ICD-10-PCS | Mod: 25,,, | Performed by: FAMILY MEDICINE

## 2022-05-10 PROCEDURE — 96372 PR INJECTION,THERAP/PROPH/DIAG2ST, IM OR SUBCUT: ICD-10-PCS | Mod: ,,, | Performed by: FAMILY MEDICINE

## 2022-05-10 PROCEDURE — 96372 THER/PROPH/DIAG INJ SC/IM: CPT | Mod: ,,, | Performed by: FAMILY MEDICINE

## 2022-05-10 PROCEDURE — 99213 OFFICE O/P EST LOW 20 MIN: CPT | Mod: 25,,, | Performed by: FAMILY MEDICINE

## 2022-05-10 RX ORDER — AZITHROMYCIN 250 MG/1
TABLET, FILM COATED ORAL
Qty: 6 TABLET | Refills: 0 | Status: SHIPPED | OUTPATIENT
Start: 2022-05-10 | End: 2023-06-21

## 2022-05-10 RX ORDER — OMEPRAZOLE 20 MG/1
20 CAPSULE, DELAYED RELEASE ORAL DAILY
COMMUNITY
Start: 2022-02-15 | End: 2023-04-24 | Stop reason: SDUPTHER

## 2022-05-10 RX ORDER — BETAMETHASONE SODIUM PHOSPHATE AND BETAMETHASONE ACETATE 3; 3 MG/ML; MG/ML
6 INJECTION, SUSPENSION INTRA-ARTICULAR; INTRALESIONAL; INTRAMUSCULAR; SOFT TISSUE
Status: COMPLETED | OUTPATIENT
Start: 2022-05-10 | End: 2022-05-10

## 2022-05-10 RX ORDER — LISINOPRIL AND HYDROCHLOROTHIAZIDE 20; 25 MG/1; MG/1
1 TABLET ORAL DAILY
COMMUNITY
Start: 2022-02-03 | End: 2023-04-24 | Stop reason: SDUPTHER

## 2022-05-10 RX ADMIN — BETAMETHASONE SODIUM PHOSPHATE AND BETAMETHASONE ACETATE 6 MG: 3; 3 INJECTION, SUSPENSION INTRA-ARTICULAR; INTRALESIONAL; INTRAMUSCULAR; SOFT TISSUE at 09:05

## 2022-05-10 NOTE — PROGRESS NOTES
"Subjective:       Patient ID: Tanja Cohen is a 76 y.o. female.    Vitals:  height is 5' 2" (1.575 m) and weight is 47.6 kg (105 lb). Her oral temperature is 98 °F (36.7 °C). Her blood pressure is 127/67 and her pulse is 65. Her respiration is 16 and oxygen saturation is 97%.     Chief Complaint: Cough, Headache (Since sunday), Sinus Problem, and Nasal Congestion    Since Sunday  76-year-old known hypertensive present to clinic with concerns of nasal congestion, sinus congestion, postnasal drip and coughing since 1 week.  Symptoms gradual in onset and worsening.  No history of asthma.  Denies shortness of Breath or wheezing.  No concerns of exposure to infections.  Requesting for cortisone shot as it helped in the past.  Understands the risk and the benefits    Cough  This is a new problem. The current episode started in the past 7 days. The problem has been gradually worsening. The problem occurs every few minutes. The cough is non-productive. Associated symptoms include headaches, nasal congestion and postnasal drip. Nothing aggravates the symptoms. She has tried OTC cough suppressant for the symptoms. The treatment provided no relief.   Headache   This is a new problem. The current episode started in the past 7 days. The problem occurs daily. The problem has been unchanged. The pain is located in the frontal region. The pain does not radiate. The pain quality is similar to prior headaches. The quality of the pain is described as aching. The pain is at a severity of 5/10. The pain is moderate. Associated symptoms include coughing. Nothing aggravates the symptoms. She has tried nothing for the symptoms. The treatment provided no relief.   Sinus Problem  This is a new problem. The current episode started in the past 7 days. The problem is unchanged. There has been no fever. Her pain is at a severity of 4/10. The pain is mild. Associated symptoms include coughing and headaches. Past treatments include oral " decongestants. The treatment provided no relief.       HENT: Positive for postnasal drip.    Respiratory: Positive for cough.    Neurological: Positive for headaches.     Constitutional_No  Fever , Body aches, Chills  HENT_Mild Sore throat,No  Difficulty swallowing, postnasal drainage  Respiratory_no wheezing, no shortness of breath  Cardiovascular_no chest pain  Gastrointestinal_ No nausea,No vomiting, No diarrhea  Musculoskeletal_no joint pain, no joint swelling  Integumentary_no skin rash    Objective:      Physical Exam    General : Alert and Oriented, No apparent distress, afebrile  Neck - supple  HENT : Oropharynx no redness or swelling. TMs intact mild fluid no redness.   Respiratory : Bilateral equal breath sounds, nonlabored respirations  Cardiovascular : Rate, rhythm regular, normal volume pulse, no murmur  Integumentary : Warm, Dry and no rash  Assessment:       1. Acute bronchitis, unspecified organism          Plan:         Acute bronchitis, unspecified organism  -     betamethasone acetate-betamethasone sodium phosphate injection 6 mg  -     azithromycin (ZITHROMAX Z-ROSETTE) 250 MG tablet; Use as directed  Dispense: 6 tablet; Refill: 0        cool mist vaporizer and cough drop to keep there was moist.  Claritin or Allegra for condition.    Coricidin HBP for cough and cold.  Adequate hydration and rest.  Monitor the symptoms closely.    [facility] today risk and benefits discussed voiced understanding.

## 2022-06-20 ENCOUNTER — OFFICE VISIT (OUTPATIENT)
Dept: GYNECOLOGY | Facility: CLINIC | Age: 77
End: 2022-06-20
Payer: MEDICARE

## 2022-06-20 VITALS
WEIGHT: 115.63 LBS | SYSTOLIC BLOOD PRESSURE: 122 MMHG | BODY MASS INDEX: 21.28 KG/M2 | HEART RATE: 64 BPM | OXYGEN SATURATION: 98 % | HEIGHT: 62 IN | DIASTOLIC BLOOD PRESSURE: 72 MMHG | TEMPERATURE: 98 F | RESPIRATION RATE: 18 BRPM

## 2022-06-20 DIAGNOSIS — N95.2 ATROPHIC VAGINITIS: ICD-10-CM

## 2022-06-20 DIAGNOSIS — Z01.419 ENCOUNTER FOR ANNUAL ROUTINE GYNECOLOGICAL EXAMINATION: Primary | ICD-10-CM

## 2022-06-20 PROCEDURE — G0101 PR CA SCREEN;PELVIC/BREAST EXAM: ICD-10-PCS | Mod: S$PBB,,, | Performed by: NURSE PRACTITIONER

## 2022-06-20 PROCEDURE — 99214 OFFICE O/P EST MOD 30 MIN: CPT | Mod: PBBFAC | Performed by: NURSE PRACTITIONER

## 2022-06-20 PROCEDURE — G0101 CA SCREEN;PELVIC/BREAST EXAM: HCPCS | Mod: S$PBB,,, | Performed by: NURSE PRACTITIONER

## 2022-06-20 RX ORDER — CONJUGATED ESTROGENS 0.62 MG/G
CREAM VAGINAL
Qty: 30 G | Refills: 12 | Status: SHIPPED | OUTPATIENT
Start: 2022-06-20 | End: 2023-06-12 | Stop reason: SDUPTHER

## 2022-06-20 RX ORDER — CONJUGATED ESTROGENS 0.62 MG/G
CREAM VAGINAL
COMMUNITY
Start: 2022-02-14 | End: 2022-06-20 | Stop reason: SDUPTHER

## 2022-06-20 RX ORDER — NAPROXEN SODIUM 220 MG/1
81 TABLET, FILM COATED ORAL
COMMUNITY
Start: 2021-08-09 | End: 2023-06-12 | Stop reason: SDUPTHER

## 2022-06-20 NOTE — PROGRESS NOTES
"  Subjective:       Patient ID: Tanja Cohen is a 76 y.o. female.    Chief Complaint:  Well Woman      History of Present Illness  Pt is  here for annual gyn exam. Denies hx of mod/severe dysplasia. Pt is postmenopausal. Last pap--NIL. Denies vaginal bleeding or discharge. Denies abd/pelvic pain. Currently using Premarin cream 2x/week for atrophic vaginitis. Denies urinary complaints. MG-21- BIRADS 2. Denies tobacco use. Denies fly hx of breast, ovarian, uterine or colon cancer. Pt states she walks 3 miles/day and weight lifting 3 days/week. She also swims. States she gets her calcium through diet and vitamin D in sun. Colonoscopy in 2018. Followed by IM for primary care.    GYN & OB History  No LMP recorded. Patient is postmenopausal.     Review of patient's allergies indicates:   Allergen Reactions    Cephalexin Hives     Past Medical History:   Diagnosis Date    Hypertension      OB History    Para Term  AB Living   2       2     SAB IAB Ectopic Multiple Live Births                  # Outcome Date GA Lbr Jose R/2nd Weight Sex Delivery Anes PTL Lv   2 AB 1972           1 AB 1968                Review of Systems  Review of Systems    Negative except for pertinent findings for positives per HPI     Objective:    Physical Exam    /72 (BP Location: Right arm, Patient Position: Sitting, BP Method: Medium (Automatic))   Pulse 64   Temp 97.7 °F (36.5 °C)   Resp 18   Ht 5' 2" (1.575 m)   Wt 52.4 kg (115 lb 9.6 oz)   SpO2 98%   BMI 21.14 kg/m²   GENERAL: Well-developed female in no acute distress.  SKIN: Normal to inspection, warm and intact.  BREASTS: No masses, lumps, discharge, tenderness.  ABDOMEN: Soft, non tender.  VULVA: General appearance normal; external genitalia with no lesions or erythema.  BLADDER: No tenderness.  VAGINA: Mucosa atrophic, no discharge or lesions.  CERVIX: Atrophic, no discharge.  BIMANUAL EXAM: reveals a 6 week-sized uterus. The uterus is regular, " mobile, and non tender. Theo adnexa reveal no evidence of masses, tenderness.  PSYCHIATRIC: Patient is oriented to person, place, and time. Mood and affect are normal.    Assessment:       1. Encounter for annual routine gynecological examination    2. Atrophic vaginitis       Plan:   Tanja was seen today for well woman.    Diagnoses and all orders for this visit:    Encounter for annual routine gynecological examination    Atrophic vaginitis  -     PREMARIN vaginal cream; APPLY DIME SIZED AMOUNT TO VAGINAL WALL WITH GLOVED FINGER TWO TIMES A WEEK. (DO NOT USE WITH INTERCOURSE)    Pelvic today. Pap deferred d/t age over 65 and adequate screening.  Continue Premarin cream 2x/week.  Follow up in about 1 year (around 6/20/2023) for annual exam.

## 2022-06-22 DIAGNOSIS — Z12.31 VISIT FOR SCREENING MAMMOGRAM: Primary | ICD-10-CM

## 2022-06-30 ENCOUNTER — HOSPITAL ENCOUNTER (OUTPATIENT)
Dept: RADIOLOGY | Facility: HOSPITAL | Age: 77
Discharge: HOME | End: 2022-06-30
Attending: NURSE PRACTITIONER
Payer: MEDICARE

## 2022-06-30 DIAGNOSIS — Z12.31 VISIT FOR SCREENING MAMMOGRAM: ICD-10-CM

## 2022-06-30 PROCEDURE — 77067 SCR MAMMO BI INCL CAD: CPT | Mod: 26,,, | Performed by: RADIOLOGY

## 2022-06-30 PROCEDURE — 77067 SCR MAMMO BI INCL CAD: CPT | Mod: TC

## 2022-06-30 PROCEDURE — 77067 MAMMO DIGITAL SCREENING BILAT: ICD-10-PCS | Mod: 26,,, | Performed by: RADIOLOGY

## 2022-12-27 ENCOUNTER — OFFICE VISIT (OUTPATIENT)
Dept: INTERNAL MEDICINE | Facility: CLINIC | Age: 77
End: 2022-12-27
Payer: MEDICARE

## 2022-12-27 VITALS
HEART RATE: 78 BPM | TEMPERATURE: 98 F | HEIGHT: 62 IN | DIASTOLIC BLOOD PRESSURE: 74 MMHG | BODY MASS INDEX: 20.98 KG/M2 | SYSTOLIC BLOOD PRESSURE: 128 MMHG | RESPIRATION RATE: 18 BRPM | WEIGHT: 114 LBS

## 2022-12-27 DIAGNOSIS — I10 HYPERTENSION, UNSPECIFIED TYPE: Primary | ICD-10-CM

## 2022-12-27 DIAGNOSIS — E78.5 DYSLIPIDEMIA: ICD-10-CM

## 2022-12-27 DIAGNOSIS — N95.2 VAGINAL ATROPHY: ICD-10-CM

## 2022-12-27 DIAGNOSIS — K21.9 GASTROESOPHAGEAL REFLUX DISEASE, UNSPECIFIED WHETHER ESOPHAGITIS PRESENT: ICD-10-CM

## 2022-12-27 DIAGNOSIS — E55.9 VITAMIN D DEFICIENCY: ICD-10-CM

## 2022-12-27 DIAGNOSIS — M85.80 OSTEOPENIA, UNSPECIFIED LOCATION: ICD-10-CM

## 2022-12-27 DIAGNOSIS — R73.03 PRE-DIABETES: ICD-10-CM

## 2022-12-27 PROCEDURE — 99213 OFFICE O/P EST LOW 20 MIN: CPT | Mod: PBBFAC

## 2022-12-27 NOTE — PROGRESS NOTES
[facility]  CLINIC NOTE    Patient Name: Tanja Cohen  YOB: 1945  Chief Complaint: Follow-up (Denies any concerns at this time)     PRESENTING HISTORY   History of Present Illness:  Ms. Tanja Cohen is a 77 y.o. female w/ PMH     Review of Systems:  12 point review of symptoms negative unless otherwisehypertension, GERD, Hallux valgus deformity, vagina atrophy and dyslipidemia presents to medicine clinic for a follow-up visit.     Today, no acute complaints. Pt works out at the gym daily where she swims and lifts weights. Does not want to make any adjustments to her medications. Does not want further bone density or MMG.     PAST HISTORY:     Past Medical History:   Diagnosis Date    Hypertension         Past Surgical History:   Procedure Laterality Date    SURGICAL REMOVAL OF BUNION WITH OSTEOTOMY OF METATARSAL BONE         Family History   Problem Relation Age of Onset    Alcohol abuse Mother     Kidney disease Father     No Known Problems Sister     No Known Problems Brother        Social History     Socioeconomic History    Marital status:    Tobacco Use    Smoking status: Never    Smokeless tobacco: Never   Substance and Sexual Activity    Alcohol use: Not Currently    Drug use: Never    Sexual activity: Yes     Birth control/protection: Post-menopausal     Social Determinants of Health     Financial Resource Strain: Low Risk     Difficulty of Paying Living Expenses: Not hard at all   Food Insecurity: No Food Insecurity    Worried About Running Out of Food in the Last Year: Never true    Ran Out of Food in the Last Year: Never true   Transportation Needs: No Transportation Needs    Lack of Transportation (Medical): No    Lack of Transportation (Non-Medical): No   Physical Activity: Insufficiently Active    Days of Exercise per Week: 2 days    Minutes of Exercise per Session: 60 min   Stress: No Stress Concern Present    Feeling of Stress : Not at all  "  Social Connections: Moderately Isolated    Frequency of Communication with Friends and Family: More than three times a week    Frequency of Social Gatherings with Friends and Family: Once a week    Attends Denominational Services: More than 4 times per year    Active Member of Clubs or Organizations: No    Attends Club or Organization Meetings: Never    Marital Status:    Housing Stability: Low Risk     Unable to Pay for Housing in the Last Year: No    Number of Places Lived in the Last Year: 1    Unstable Housing in the Last Year: No       MEDICATIONS:     Current Outpatient Medications   Medication Instructions    aspirin 81 mg, Oral    azithromycin (ZITHROMAX Z-ROSETTE) 250 MG tablet Use as directed    lisinopriL-hydrochlorothiazide (PRINZIDE,ZESTORETIC) 20-25 mg Tab 1 tablet, Oral, Daily, for 90 days    omeprazole (PRILOSEC) 20 mg, Oral, Daily    PREMARIN vaginal cream APPLY DIME SIZED AMOUNT TO VAGINAL WALL WITH GLOVED FINGER TWO TIMES A WEEK. (DO NOT USE WITH INTERCOURSE)        OBJECTIVE:   Vital Signs:  Vitals:    12/27/22 1319   BP: 128/74   Pulse: 78   Resp: 18   Temp: 97.7 °F (36.5 °C)   TempSrc: Oral   Weight: 51.7 kg (114 lb)   Height: 5' 2" (1.575 m)        Physical Exam:  General: Awake, alert, & oriented to person, place & time. No acute distress  Psychiatric: Mood and affect normal  HEENT: Normocephalic, atraumatic. Face symmetric.  Cardiovascular: Regular rate & rhythm. Normal S1 & S2 w/out murmurs, rubs or gallops.  No JVD appreciated.  2+ pulses throughout.  Pulmonary: Bilateral symmetric chest rise. Non-labored, no wheezes, rhonchi or crackles are appreciated.  Abdominal:  Soft, nontender, nondistended. Bowel sounds present  Extremities: No clubbing, cyanosis or edema.  Skin:  Exposed skin is warm & dry.  Neuro:   Patient moves all extremities equally. Sensation intact bilateraly.      Laboratory  Lab Results   Component Value Date     08/12/2021    K 3.8 08/12/2021    CO2 27 08/12/2021 "    BUN 15.0 08/12/2021    CREATININE 0.68 08/12/2021    CALCIUM 9.4 08/12/2021    BILIDIR 0.2 02/23/2021    IBILI 0.30 02/23/2021    ALKPHOS 56 02/23/2021    AST 15 02/23/2021    ALT 14 02/23/2021        Lab Results   Component Value Date    WBC 9.5 02/23/2021    RBC 5.11 02/23/2021    HGB 14.8 02/23/2021    HCT 45.6 02/23/2021    MCV 89.2 02/23/2021    MCH 29.0 02/23/2021    MCHC 32.5 02/23/2021    RDW 12.3 02/23/2021     02/23/2021    MPV 9.2 02/23/2021        Diagnostic Results:  No results found in the last 30 days.   No results found in the last 24 hours.     ASSESSMENT & PLAN:     Hypertension  BP controlled at this time  continue HCTZ-Lisinopril      FIT test positive  denies BRBPR or bloody stools  previously referred to GI for colonoscopy      Vitamin D deficiency  On vitamin D supplements 4x per week, continue  Ordered rpt level      Dyslipidemia  Patient refuses to be placed on any statins  continue active lifestyle      GERD  currently on PPI therapy  symptoms controlled  continue to monitor      Generalized Musculoskeletal pain  continue low dose Ibuprofen prn      Vaginal Atrophy  f/u in GYN clinic  continue topical estrogen cream      Hallux valgus deformity  underwent procedure for correction for RLE  underwent LLE bunionectomyon 08/13/21  reports symptoms well controlled     Osteopenia  diagnosed on bone density in 2012  continue weight bearing exercise  continue vitamin d supplementation        Prevention  2nd Shingles 2/2022, up to date with remaining vaccines  MMG 6/30/22 WNL  counselled to continue exercise      RTC in 6 months with labs.       Keyanna Sotelo MD  PGY-3, Internal Medicine

## 2023-01-28 ENCOUNTER — OFFICE VISIT (OUTPATIENT)
Dept: URGENT CARE | Facility: CLINIC | Age: 78
End: 2023-01-28
Payer: MEDICARE

## 2023-01-28 VITALS
HEART RATE: 83 BPM | RESPIRATION RATE: 20 BRPM | WEIGHT: 112 LBS | DIASTOLIC BLOOD PRESSURE: 71 MMHG | HEIGHT: 62 IN | BODY MASS INDEX: 20.61 KG/M2 | SYSTOLIC BLOOD PRESSURE: 161 MMHG | OXYGEN SATURATION: 98 % | TEMPERATURE: 98 F

## 2023-01-28 DIAGNOSIS — M46.1 SI (SACROILIAC) JOINT INFLAMMATION: Primary | ICD-10-CM

## 2023-01-28 PROCEDURE — 1101F PT FALLS ASSESS-DOCD LE1/YR: CPT | Mod: CPTII,,, | Performed by: FAMILY MEDICINE

## 2023-01-28 PROCEDURE — 1159F PR MEDICATION LIST DOCUMENTED IN MEDICAL RECORD: ICD-10-PCS | Mod: CPTII,,, | Performed by: FAMILY MEDICINE

## 2023-01-28 PROCEDURE — 3078F DIAST BP <80 MM HG: CPT | Mod: CPTII,,, | Performed by: FAMILY MEDICINE

## 2023-01-28 PROCEDURE — 3078F PR MOST RECENT DIASTOLIC BLOOD PRESSURE < 80 MM HG: ICD-10-PCS | Mod: CPTII,,, | Performed by: FAMILY MEDICINE

## 2023-01-28 PROCEDURE — 1159F MED LIST DOCD IN RCRD: CPT | Mod: CPTII,,, | Performed by: FAMILY MEDICINE

## 2023-01-28 PROCEDURE — 3288F PR FALLS RISK ASSESSMENT DOCUMENTED: ICD-10-PCS | Mod: CPTII,,, | Performed by: FAMILY MEDICINE

## 2023-01-28 PROCEDURE — 1160F PR REVIEW ALL MEDS BY PRESCRIBER/CLIN PHARMACIST DOCUMENTED: ICD-10-PCS | Mod: CPTII,,, | Performed by: FAMILY MEDICINE

## 2023-01-28 PROCEDURE — 3288F FALL RISK ASSESSMENT DOCD: CPT | Mod: CPTII,,, | Performed by: FAMILY MEDICINE

## 2023-01-28 PROCEDURE — 3077F SYST BP >= 140 MM HG: CPT | Mod: CPTII,,, | Performed by: FAMILY MEDICINE

## 2023-01-28 PROCEDURE — 99213 OFFICE O/P EST LOW 20 MIN: CPT | Mod: 25,,, | Performed by: FAMILY MEDICINE

## 2023-01-28 PROCEDURE — 96372 THER/PROPH/DIAG INJ SC/IM: CPT | Mod: ,,, | Performed by: FAMILY MEDICINE

## 2023-01-28 PROCEDURE — 99213 PR OFFICE/OUTPT VISIT, EST, LEVL III, 20-29 MIN: ICD-10-PCS | Mod: 25,,, | Performed by: FAMILY MEDICINE

## 2023-01-28 PROCEDURE — 3077F PR MOST RECENT SYSTOLIC BLOOD PRESSURE >= 140 MM HG: ICD-10-PCS | Mod: CPTII,,, | Performed by: FAMILY MEDICINE

## 2023-01-28 PROCEDURE — 96372 PR INJECTION,THERAP/PROPH/DIAG2ST, IM OR SUBCUT: ICD-10-PCS | Mod: ,,, | Performed by: FAMILY MEDICINE

## 2023-01-28 PROCEDURE — 1101F PR PT FALLS ASSESS DOC 0-1 FALLS W/OUT INJ PAST YR: ICD-10-PCS | Mod: CPTII,,, | Performed by: FAMILY MEDICINE

## 2023-01-28 PROCEDURE — 1160F RVW MEDS BY RX/DR IN RCRD: CPT | Mod: CPTII,,, | Performed by: FAMILY MEDICINE

## 2023-01-28 RX ORDER — KETOROLAC TROMETHAMINE 30 MG/ML
30 INJECTION, SOLUTION INTRAMUSCULAR; INTRAVENOUS
Status: COMPLETED | OUTPATIENT
Start: 2023-01-28 | End: 2023-01-28

## 2023-01-28 RX ORDER — METHOCARBAMOL 500 MG/1
500 TABLET, FILM COATED ORAL NIGHTLY
Qty: 5 TABLET | Refills: 0 | Status: SHIPPED | OUTPATIENT
Start: 2023-01-28 | End: 2023-02-02

## 2023-01-28 RX ADMIN — KETOROLAC TROMETHAMINE 30 MG: 30 INJECTION, SOLUTION INTRAMUSCULAR; INTRAVENOUS at 11:01

## 2023-01-28 NOTE — PATIENT INSTRUCTIONS
Toradol given today.  Muscle relaxer at bedtime as it causes sedation.  Recheck if not improving.

## 2023-01-28 NOTE — PROGRESS NOTES
"Subjective:       Patient ID: Tanja Cohen is a 77 y.o. female.    Vitals:  height is 5' 2" (1.575 m) and weight is 50.8 kg (112 lb). Her oral temperature is 97.8 °F (36.6 °C). Her blood pressure is 161/71 (abnormal) and her pulse is 83. Her respiration is 20 and oxygen saturation is 98%.     Chief Complaint: Hip Pain (Bilateral hip pain worse on left, slipped 2 days ago but did not fall)    Pt had a slip without full fall now with hip pain L>R.  Pulling sensation to B lower back.  Worse with sitting.  Better with walking and standing.  No radiation.  Tried pain meds but made her dizzy so stopped.       Constitution: Negative for chills, sweating and fever.   HENT:  Negative for sinus pressure.    Respiratory:  Negative for cough.    Genitourinary:  Negative for frequency, urgency, flank pain, vaginal discharge and pelvic pain.   Musculoskeletal:  Positive for pain and back pain. Negative for joint swelling.   Skin:  Negative for rash.   Neurological:  Negative for loss of consciousness.     Objective:      Physical Exam   Constitutional: She is oriented to person, place, and time. She appears well-developed. She is cooperative. No distress.   HENT:   Head: Normocephalic and atraumatic.   Nose: Nose normal.   Mouth/Throat: Oropharynx is clear and moist and mucous membranes are normal.   Eyes: Conjunctivae and lids are normal.   Neck: Trachea normal and phonation normal. Neck supple.   Cardiovascular: Normal rate, regular rhythm, normal heart sounds and normal pulses.   Pulmonary/Chest: Effort normal and breath sounds normal.   Abdominal: Normal appearance and bowel sounds are normal. She exhibits no mass. Soft.   Musculoskeletal:         General: No deformity.   Neurological: She is alert and oriented to person, place, and time. She has normal strength and normal reflexes. No sensory deficit.   Skin: Skin is warm, dry, intact and not diaphoretic.   Psychiatric: Her speech is normal and behavior is normal. " Judgment and thought content normal.   Nursing note and vitals reviewed.      Assessment:       1. SI (sacroiliac) joint inflammation          Plan:         SI (sacroiliac) joint inflammation  -     ketorolac injection 30 mg  -     methocarbamoL (ROBAXIN) 500 MG Tab; Take 1 tablet (500 mg total) by mouth every evening. for 5 days  Dispense: 5 tablet; Refill: 0

## 2023-02-02 DIAGNOSIS — M46.1 SI (SACROILIAC) JOINT INFLAMMATION: Primary | ICD-10-CM

## 2023-02-02 RX ORDER — MELOXICAM 15 MG/1
15 TABLET ORAL DAILY
Qty: 14 TABLET | Refills: 0 | Status: SHIPPED | OUTPATIENT
Start: 2023-02-02 | End: 2023-02-16

## 2023-04-22 ENCOUNTER — HOSPITAL ENCOUNTER (EMERGENCY)
Facility: HOSPITAL | Age: 78
Discharge: HOME | End: 2023-04-22
Attending: EMERGENCY MEDICINE
Payer: MEDICARE

## 2023-04-22 VITALS
DIASTOLIC BLOOD PRESSURE: 69 MMHG | HEART RATE: 78 BPM | HEIGHT: 62 IN | WEIGHT: 108 LBS | TEMPERATURE: 98 F | OXYGEN SATURATION: 98 % | SYSTOLIC BLOOD PRESSURE: 134 MMHG | BODY MASS INDEX: 19.88 KG/M2 | RESPIRATION RATE: 20 BRPM

## 2023-04-22 DIAGNOSIS — Z76.0 MEDICATION REFILL: Primary | ICD-10-CM

## 2023-04-22 PROCEDURE — 99282 EMERGENCY DEPT VISIT SF MDM: CPT

## 2023-04-22 NOTE — ED PROVIDER NOTES
Encounter Date: 4/22/2023       History     Chief Complaint   Patient presents with    Medication Refill     Needs refill of meds     Patient with pmhx of HTN and GERD is here today requesting medication refill of lisinopril and omeprazole. She is not actually out. She says she was advised to come to the ED for her refills. She has a pcp. She denies any acute complaints today.     The history is provided by the patient. No  was used.   Review of patient's allergies indicates:   Allergen Reactions    Cephalexin Hives     Past Medical History:   Diagnosis Date    GERD (gastroesophageal reflux disease)     Hypertension      Past Surgical History:   Procedure Laterality Date    SURGICAL REMOVAL OF BUNION WITH OSTEOTOMY OF METATARSAL BONE       Family History   Problem Relation Age of Onset    Alcohol abuse Mother     Kidney disease Father     No Known Problems Sister     No Known Problems Brother      Social History     Tobacco Use    Smoking status: Never    Smokeless tobacco: Never   Substance Use Topics    Alcohol use: Not Currently    Drug use: Never     Review of Systems   Constitutional:  Negative for chills and fever.   Respiratory:  Negative for cough, chest tightness and shortness of breath.    Cardiovascular:  Negative for chest pain.   Gastrointestinal:  Negative for abdominal pain, nausea and vomiting.   Genitourinary:  Negative for flank pain.   Musculoskeletal:  Negative for gait problem.   Skin:  Negative for wound.   Neurological:  Negative for syncope, weakness, light-headedness and headaches.   All other systems reviewed and are negative.    Physical Exam     Initial Vitals [04/22/23 0808]   BP Pulse Resp Temp SpO2   134/69 78 20 97.7 °F (36.5 °C) 98 %      MAP       --         Physical Exam    Nursing note and vitals reviewed.  Constitutional: She appears well-developed and well-nourished. She is not diaphoretic. No distress.   HENT:   Head: Normocephalic and atraumatic.    Mouth/Throat: Oropharynx is clear and moist. No oropharyngeal exudate.   Eyes: Conjunctivae and EOM are normal.   Neck: Neck supple.   Normal range of motion.  Cardiovascular:  Normal rate, regular rhythm, normal heart sounds and intact distal pulses.           Pulmonary/Chest: Breath sounds normal. No respiratory distress.   Abdominal: Abdomen is soft. She exhibits no distension. There is no abdominal tenderness.   Musculoskeletal:         General: No edema.      Cervical back: Normal range of motion and neck supple.     Neurological: She is alert and oriented to person, place, and time. GCS score is 15. GCS eye subscore is 4. GCS verbal subscore is 5. GCS motor subscore is 6.   Skin: Skin is warm. Capillary refill takes less than 2 seconds.   Psychiatric: She has a normal mood and affect.       ED Course   Procedures  Labs Reviewed - No data to display       Imaging Results    None          Medications - No data to display                           Clinical Impression:   Final diagnoses:  [Z76.0] Medication refill (Primary)        ED Disposition Condition    Discharge Stable          ED Prescriptions    None       Follow-up Information       Follow up With Specialties Details Why Contact Info    [facility] - Emergency Dept Emergency Medicine  If symptoms worsen return to ED immediately [address] [facility] [address] [phone]    Addison Licona MD Internal Medicine In 2 days  [address] [facility] [address] [phone]               RICHARD Riley  04/22/23 0948

## 2023-04-24 DIAGNOSIS — I10 HYPERTENSION, UNSPECIFIED TYPE: ICD-10-CM

## 2023-04-24 DIAGNOSIS — K21.9 GASTROESOPHAGEAL REFLUX DISEASE, UNSPECIFIED WHETHER ESOPHAGITIS PRESENT: Primary | ICD-10-CM

## 2023-04-24 RX ORDER — OMEPRAZOLE 20 MG/1
20 CAPSULE, DELAYED RELEASE ORAL DAILY
Qty: 30 CAPSULE | Refills: 5 | Status: SHIPPED | OUTPATIENT
Start: 2023-04-24 | End: 2023-06-12 | Stop reason: SDUPTHER

## 2023-04-24 RX ORDER — LISINOPRIL AND HYDROCHLOROTHIAZIDE 20; 25 MG/1; MG/1
1 TABLET ORAL DAILY
Qty: 30 TABLET | Refills: 5 | Status: SHIPPED | OUTPATIENT
Start: 2023-04-24 | End: 2023-06-12 | Stop reason: SDUPTHER

## 2023-04-24 NOTE — TELEPHONE ENCOUNTER
----- Message from Rodolfo Acevedo sent at 4/24/2023  7:09 AM CDT -----  Regarding: Dr Pablo RX RQ  Provider: Dr Pablo      Last Visit: 12/27/22      Next Visit: 6/12/22       Patient's Contact #: [phone]      Medication Name & Dose: Lisinopril 20-25mg                                             Omeprazole 20mg       Preferred Pharmacy: [facility]      Comment:

## 2023-06-05 ENCOUNTER — LAB VISIT (OUTPATIENT)
Dept: LAB | Facility: HOSPITAL | Age: 78
End: 2023-06-05
Attending: STUDENT IN AN ORGANIZED HEALTH CARE EDUCATION/TRAINING PROGRAM
Payer: MEDICARE

## 2023-06-05 DIAGNOSIS — E78.5 DYSLIPIDEMIA: ICD-10-CM

## 2023-06-05 DIAGNOSIS — K21.9 GASTROESOPHAGEAL REFLUX DISEASE, UNSPECIFIED WHETHER ESOPHAGITIS PRESENT: ICD-10-CM

## 2023-06-05 DIAGNOSIS — I10 HYPERTENSION, UNSPECIFIED TYPE: ICD-10-CM

## 2023-06-05 DIAGNOSIS — R73.03 PRE-DIABETES: ICD-10-CM

## 2023-06-05 DIAGNOSIS — E55.9 VITAMIN D DEFICIENCY: ICD-10-CM

## 2023-06-05 DIAGNOSIS — M85.80 OSTEOPENIA, UNSPECIFIED LOCATION: ICD-10-CM

## 2023-06-05 LAB
ALBUMIN SERPL-MCNC: 3.8 G/DL (ref 3.4–4.8)
ALBUMIN/GLOB SERPL: 1 RATIO (ref 1.1–2)
ALP SERPL-CCNC: 62 UNIT/L (ref 40–150)
ALT SERPL-CCNC: 9 UNIT/L (ref 0–55)
AST SERPL-CCNC: 16 UNIT/L (ref 5–34)
BASOPHILS # BLD AUTO: 0.11 X10(3)/MCL
BASOPHILS NFR BLD AUTO: 1.5 %
BILIRUBIN DIRECT+TOT PNL SERPL-MCNC: 0.4 MG/DL
BUN SERPL-MCNC: 25.8 MG/DL (ref 9.8–20.1)
CALCIUM SERPL-MCNC: 9 MG/DL (ref 8.4–10.2)
CHLORIDE SERPL-SCNC: 105 MMOL/L (ref 98–107)
CHOLEST SERPL-MCNC: 238 MG/DL
CHOLEST/HDLC SERPL: 4 {RATIO} (ref 0–5)
CO2 SERPL-SCNC: 28 MMOL/L (ref 23–31)
CREAT SERPL-MCNC: 0.78 MG/DL (ref 0.55–1.02)
DEPRECATED CALCIDIOL+CALCIFEROL SERPL-MC: 69.4 NG/ML (ref 30–80)
EOSINOPHIL # BLD AUTO: 0.57 X10(3)/MCL (ref 0–0.9)
EOSINOPHIL NFR BLD AUTO: 7.5 %
ERYTHROCYTE [DISTWIDTH] IN BLOOD BY AUTOMATED COUNT: 12.7 % (ref 11.5–17)
EST. AVERAGE GLUCOSE BLD GHB EST-MCNC: 108.3 MG/DL
GFR SERPLBLD CREATININE-BSD FMLA CKD-EPI: >60 MLS/MIN/1.73/M2
GLOBULIN SER-MCNC: 3.7 GM/DL (ref 2.4–3.5)
GLUCOSE SERPL-MCNC: 97 MG/DL (ref 82–115)
HBA1C MFR BLD: 5.4 %
HCT VFR BLD AUTO: 42.3 % (ref 37–47)
HDLC SERPL-MCNC: 63 MG/DL (ref 35–60)
HGB BLD-MCNC: 13.8 G/DL (ref 12–16)
IMM GRANULOCYTES # BLD AUTO: 0.02 X10(3)/MCL (ref 0–0.04)
IMM GRANULOCYTES NFR BLD AUTO: 0.3 %
LDLC SERPL CALC-MCNC: 158 MG/DL (ref 50–140)
LYMPHOCYTES # BLD AUTO: 1.65 X10(3)/MCL (ref 0.6–4.6)
LYMPHOCYTES NFR BLD AUTO: 21.8 %
MCH RBC QN AUTO: 28.6 PG (ref 27–31)
MCHC RBC AUTO-ENTMCNC: 32.6 G/DL (ref 33–36)
MCV RBC AUTO: 87.8 FL (ref 80–94)
MONOCYTES # BLD AUTO: 0.75 X10(3)/MCL (ref 0.1–1.3)
MONOCYTES NFR BLD AUTO: 9.9 %
NEUTROPHILS # BLD AUTO: 4.46 X10(3)/MCL (ref 2.1–9.2)
NEUTROPHILS NFR BLD AUTO: 59 %
NRBC BLD AUTO-RTO: 0 %
PLATELET # BLD AUTO: 379 X10(3)/MCL (ref 130–400)
PMV BLD AUTO: 9.2 FL (ref 7.4–10.4)
POTASSIUM SERPL-SCNC: 3.6 MMOL/L (ref 3.5–5.1)
PROT SERPL-MCNC: 7.5 GM/DL (ref 5.8–7.6)
RBC # BLD AUTO: 4.82 X10(6)/MCL (ref 4.2–5.4)
SODIUM SERPL-SCNC: 141 MMOL/L (ref 136–145)
TRIGL SERPL-MCNC: 83 MG/DL (ref 37–140)
VLDLC SERPL CALC-MCNC: 17 MG/DL
WBC # SPEC AUTO: 7.56 X10(3)/MCL (ref 4.5–11.5)

## 2023-06-05 PROCEDURE — 83036 HEMOGLOBIN GLYCOSYLATED A1C: CPT

## 2023-06-05 PROCEDURE — 80053 COMPREHEN METABOLIC PANEL: CPT

## 2023-06-05 PROCEDURE — 82306 VITAMIN D 25 HYDROXY: CPT

## 2023-06-05 PROCEDURE — 80061 LIPID PANEL: CPT

## 2023-06-05 PROCEDURE — 85025 COMPLETE CBC W/AUTO DIFF WBC: CPT

## 2023-06-05 PROCEDURE — 36415 COLL VENOUS BLD VENIPUNCTURE: CPT

## 2023-06-12 ENCOUNTER — OFFICE VISIT (OUTPATIENT)
Dept: INTERNAL MEDICINE | Facility: CLINIC | Age: 78
End: 2023-06-12
Payer: MEDICARE

## 2023-06-12 VITALS
HEIGHT: 62 IN | OXYGEN SATURATION: 97 % | BODY MASS INDEX: 19.51 KG/M2 | RESPIRATION RATE: 18 BRPM | HEART RATE: 61 BPM | DIASTOLIC BLOOD PRESSURE: 66 MMHG | TEMPERATURE: 98 F | SYSTOLIC BLOOD PRESSURE: 147 MMHG | WEIGHT: 106 LBS

## 2023-06-12 DIAGNOSIS — N95.2 ATROPHIC VAGINITIS: ICD-10-CM

## 2023-06-12 DIAGNOSIS — I10 HYPERTENSION, UNSPECIFIED TYPE: ICD-10-CM

## 2023-06-12 DIAGNOSIS — N95.2 VAGINAL ATROPHY: ICD-10-CM

## 2023-06-12 DIAGNOSIS — I25.10 ASCVD (ARTERIOSCLEROTIC CARDIOVASCULAR DISEASE): Primary | ICD-10-CM

## 2023-06-12 DIAGNOSIS — K21.9 GASTROESOPHAGEAL REFLUX DISEASE, UNSPECIFIED WHETHER ESOPHAGITIS PRESENT: ICD-10-CM

## 2023-06-12 DIAGNOSIS — Z86.010 HX OF COLONIC POLYPS: ICD-10-CM

## 2023-06-12 DIAGNOSIS — E78.5 DYSLIPIDEMIA: ICD-10-CM

## 2023-06-12 PROCEDURE — 99214 OFFICE O/P EST MOD 30 MIN: CPT | Mod: PBBFAC

## 2023-06-12 RX ORDER — OMEPRAZOLE 20 MG/1
20 CAPSULE, DELAYED RELEASE ORAL DAILY
Qty: 90 CAPSULE | Refills: 3 | Status: SHIPPED | OUTPATIENT
Start: 2023-06-12 | End: 2023-12-28 | Stop reason: SDUPTHER

## 2023-06-12 RX ORDER — NAPROXEN SODIUM 220 MG/1
81 TABLET, FILM COATED ORAL DAILY
Qty: 90 TABLET | Refills: 3 | Status: SHIPPED | OUTPATIENT
Start: 2023-06-12

## 2023-06-12 RX ORDER — CONJUGATED ESTROGENS 0.62 MG/G
CREAM VAGINAL
Qty: 30 G | Refills: 12 | Status: SHIPPED | OUTPATIENT
Start: 2023-06-12 | End: 2023-06-21 | Stop reason: SDUPTHER

## 2023-06-12 RX ORDER — LISINOPRIL AND HYDROCHLOROTHIAZIDE 20; 25 MG/1; MG/1
1 TABLET ORAL DAILY
Qty: 90 TABLET | Refills: 3 | Status: SHIPPED | OUTPATIENT
Start: 2023-06-12 | End: 2023-12-28 | Stop reason: SDUPTHER

## 2023-06-12 NOTE — PROGRESS NOTES
PROGRESS NOTE  Ambulatory Clinic  Tanja Cohen [id number] 6/12/2023  Chief Complaint  Follow-up and Medication Refill (90 day supply of each)     HPI  Tanja Cohen is a 77 y.o. female who has a past medical history of GERD (gastroesophageal reflux disease) and Hypertension.  She presents to clinic today for follow-up of chronic medical conditions. Pt denies any concerns today. She reports working out riding bikes and running every day, she believes she has lost 7 lb in the past 6 months. She is not interested in dexa scan. She brought in bp log it demonstrates sbp /50-80s 2-3x weekly for the past month.    ROS  Constitutional: no fever, fatigue, weakness  Eye: no vision loss, eye redness, drainage, or pain  ENMT: no sore throat, ear pain, sinus pain/congestion, nasal congestion/drainage  Respiratory: no cough, no wheezing, no shortness of breath  Cardiovascular: no chest pain, no palpitations, no edema  Gastrointestinal: no nausea, vomiting, or diarrhea. No abdominal pain  Genitourinary: no dysuria, no urinary frequency or urgency, no hematuria  Hema/Lymph: no abnormal bruising or bleeding  Endocrine: no heat or cold intolerance, no excessive thirst or excessive urination  Musculoskeletal: no muscle or joint pain, no joint swelling  Integumentary: no skin rash or abnormal lesion  Neurologic: no headache, no dizziness, no weakness or numbness     PE  Vitals:    06/12/23 0828   BP: (!) 147/66   Pulse:    Resp:    Temp:       GA: Alert, comfortable, no acute distress. Appears younger than states age.  HEENT: Adequate dentition. No visible oropharyngeal abnormalities. No scleral icterus or JVD. No visible thyromegally.   Pulmonary: Chest wall symmetric. No accessory muscle use. No abnormalities on percussion. No tenderness to palpation. Clear to auscultation A/P/L bilaterally. No wheezing, crackles, or rhonchi.  Cardiac: RRR S1 & S2 w/o rubs/murmurs/gallops. No lower extremity edema.   Abdominal: Bowel  sounds present x 4. No appreciable hepatosplenomegaly.  Skin: No jaundice, rashes, or visible lesions.  Lymphatic: No cervical or inguinal lymphadenopathy.  Musculoskeletal: Moves all extremities 5/5.  Extremities: No clubbing or cyanosis.  Neuro: CN grossly intact, normal gait, normal coordination, no sensory or motor deficits    Assessment/Plan  Hypertension  Brought in BP log, showing well controlled she believes she is nervous today will continue to follow  continue HCTZ-Lisinopril      Vitamin D deficiency  Normal continue OTC supplementation     Dyslipidemia  Patient refuses to be placed on any statins  continue active lifestyle      GERD  currently on PPI therapy  symptoms controlled  continue to monitor      Vaginal Atrophy  f/u in GYN clinic  continue topical estrogen cream     Osteopenia  diagnosed on bone density in 2012  continue weight bearing exercise  continue vitamin d supplementation    Preventative  DM (age>45 or HTN): A1c 5.4 6/2023  Lipid (age>35): cholesterol elevated, declined statin  Osteoporosis: (age>65 or FRAX > 9.3%): declined screenign despite extensive risk/benefit discussion (reportedly she states it wa previously normal)  Syphillis (sexually active): declined  HCV (once if born btwn 3134-5291): declined   HIV (once age 15-65): declined  Lung Ca (30PY smoker age 55-79 or quit in last 15y): n/a  Colon Ca: (age 50-75-annual FOBT, 5y flex + FOBTq3 or 10y c-scope): last done 2018 recommended 5yr f/u  Mammogram (annual >40 or <10 yr age difference since 1st degree relative diagnosed): last done 6/2022 can consider continued screening, seeing gynecology  Immunizations (generally - flu, shingrix, t-dap, PCV, Covid): uptodate      RTC in 6 months.    Future Appointments   Date Time Provider Department Center   6/21/2023  9:30 AM Nathaly Groves, [facility] GYN [place] [facility]   12/28/2023  7:30 AM RESIDENT [facility], [facility] INTERNAL MEDICINE [facility] IM RES [place] [facility]       Addison Licona MD - PGY2  [facility]  [place]

## 2023-06-21 ENCOUNTER — OFFICE VISIT (OUTPATIENT)
Dept: GYNECOLOGY | Facility: CLINIC | Age: 78
End: 2023-06-21
Payer: MEDICARE

## 2023-06-21 VITALS
HEART RATE: 65 BPM | HEIGHT: 62 IN | OXYGEN SATURATION: 100 % | SYSTOLIC BLOOD PRESSURE: 135 MMHG | WEIGHT: 106.81 LBS | TEMPERATURE: 99 F | DIASTOLIC BLOOD PRESSURE: 79 MMHG | RESPIRATION RATE: 20 BRPM | BODY MASS INDEX: 19.66 KG/M2

## 2023-06-21 DIAGNOSIS — N95.2 ATROPHIC VAGINITIS: ICD-10-CM

## 2023-06-21 DIAGNOSIS — Z01.419 ENCOUNTER FOR ANNUAL ROUTINE GYNECOLOGICAL EXAMINATION: Primary | ICD-10-CM

## 2023-06-21 PROCEDURE — 1159F PR MEDICATION LIST DOCUMENTED IN MEDICAL RECORD: ICD-10-PCS | Mod: CPTII,,, | Performed by: NURSE PRACTITIONER

## 2023-06-21 PROCEDURE — 3078F DIAST BP <80 MM HG: CPT | Mod: CPTII,,, | Performed by: NURSE PRACTITIONER

## 2023-06-21 PROCEDURE — G0101 PR CA SCREEN;PELVIC/BREAST EXAM: ICD-10-PCS | Mod: S$PBB,,, | Performed by: NURSE PRACTITIONER

## 2023-06-21 PROCEDURE — 3078F PR MOST RECENT DIASTOLIC BLOOD PRESSURE < 80 MM HG: ICD-10-PCS | Mod: CPTII,,, | Performed by: NURSE PRACTITIONER

## 2023-06-21 PROCEDURE — 3288F PR FALLS RISK ASSESSMENT DOCUMENTED: ICD-10-PCS | Mod: CPTII,,, | Performed by: NURSE PRACTITIONER

## 2023-06-21 PROCEDURE — 3075F SYST BP GE 130 - 139MM HG: CPT | Mod: CPTII,,, | Performed by: NURSE PRACTITIONER

## 2023-06-21 PROCEDURE — 1101F PT FALLS ASSESS-DOCD LE1/YR: CPT | Mod: CPTII,,, | Performed by: NURSE PRACTITIONER

## 2023-06-21 PROCEDURE — G0101 CA SCREEN;PELVIC/BREAST EXAM: HCPCS | Mod: PBBFAC | Performed by: NURSE PRACTITIONER

## 2023-06-21 PROCEDURE — 1159F MED LIST DOCD IN RCRD: CPT | Mod: CPTII,,, | Performed by: NURSE PRACTITIONER

## 2023-06-21 PROCEDURE — 3075F PR MOST RECENT SYSTOLIC BLOOD PRESS GE 130-139MM HG: ICD-10-PCS | Mod: CPTII,,, | Performed by: NURSE PRACTITIONER

## 2023-06-21 PROCEDURE — G0101 CA SCREEN;PELVIC/BREAST EXAM: HCPCS | Mod: S$PBB,,, | Performed by: NURSE PRACTITIONER

## 2023-06-21 PROCEDURE — 1101F PR PT FALLS ASSESS DOC 0-1 FALLS W/OUT INJ PAST YR: ICD-10-PCS | Mod: CPTII,,, | Performed by: NURSE PRACTITIONER

## 2023-06-21 PROCEDURE — 3288F FALL RISK ASSESSMENT DOCD: CPT | Mod: CPTII,,, | Performed by: NURSE PRACTITIONER

## 2023-06-21 PROCEDURE — 99213 OFFICE O/P EST LOW 20 MIN: CPT | Mod: PBBFAC,25 | Performed by: NURSE PRACTITIONER

## 2023-06-21 RX ORDER — CONJUGATED ESTROGENS 0.62 MG/G
CREAM VAGINAL
Qty: 30 G | Refills: 12 | Status: SHIPPED | OUTPATIENT
Start: 2023-06-21

## 2023-06-21 NOTE — PROGRESS NOTES
"  Subjective:       Patient ID: Tanja Cohen is a 77 y.o. female.    Chief Complaint:  Gynecologic Exam      History of Present Illness  Pt is  here for annual gyn exam. Denies hx of mod/severe dysplasia. Pt is postmenopausal. Last pap--NIL. Denies vaginal bleeding or discharge. Denies abd/pelvic pain. Currently using Premarin cream 2x/week for atrophic vaginitis. Denies urinary complaints. MG-22- BIRADS 1. Denies tobacco use. Denies fly hx of breast, ovarian, uterine or colon cancer. Hx of osteopenia and declines DEXA. Pt states she rides bike and walks 3 miles/day and weight lifting 3 days/week. She also swims 2x/week. States she gets her calcium through diet and vitamin D in sun. Colonoscopy in 2018, f/u in 5 years. Followed by IM for primary care.    GYN & OB History  No LMP recorded. Patient is postmenopausal.       Review of patient's allergies indicates:   Allergen Reactions    Cephalexin Hives     Past Medical History:   Diagnosis Date    GERD (gastroesophageal reflux disease)     Hypertension      OB History    Para Term  AB Living   2 0     2     SAB IAB Ectopic Multiple Live Births                  # Outcome Date GA Lbr Jose R/2nd Weight Sex Delivery Anes PTL Lv   2 AB 1972           1 AB 1968                Review of Systems  Review of Systems    Negative except for pertinent findings for positives per HPI     Objective:    Physical Exam    /79 (BP Location: Left arm, Patient Position: Sitting, BP Method: Medium (Automatic))   Pulse 65   Temp 98.6 °F (37 °C) (Oral)   Resp 20   Ht 5' 2" (1.575 m)   Wt 48.4 kg (106 lb 12.8 oz)   SpO2 100%   BMI 19.53 kg/m²   GENERAL: Well-developed female in no acute distress.  SKIN: Normal to inspection, warm and intact.  BREASTS: No masses, lumps, discharge, tenderness.  VULVA: General appearance normal; external genitalia with no lesions or erythema.  VAGINA: Mucosa atrophic no abnormal discharge or lesions.  CERVIX: Atrophic, no " erythema or abnormal discharge. Nabothian cyst @ 12 o'clock  BIMANUAL EXAM: reveals a 6 week-sized uterus. The uterus is regular, mobile, and non tender. Theo adnexa reveal no evidence of masses, tenderness.  PSYCHIATRIC: Patient is oriented to person, place, and time. Mood and affect are normal.    Assessment:       1. Encounter for annual routine gynecological examination    2. Atrophic vaginitis       Plan:   Tanja was seen today for gynecologic exam.    Diagnoses and all orders for this visit:    Encounter for annual routine gynecological examination    Atrophic vaginitis  -     PREMARIN vaginal cream; APPLY DIME SIZED AMOUNT TO VAGINAL WALL WITH GLOVED FINGER TWO TIMES A WEEK. (DO NOT USE WITH INTERCOURSE)    Pelvic today, pap deferred d/t age over 65.  Continue Premarin cream 2x/week  Through shared decision, pt would like to proceed with MG every 2 years.  Follow up in about 1 year (around 6/21/2024) for annual exam.

## 2023-06-25 ENCOUNTER — OFFICE VISIT (OUTPATIENT)
Dept: URGENT CARE | Facility: CLINIC | Age: 78
End: 2023-06-25
Payer: MEDICARE

## 2023-06-25 VITALS
SYSTOLIC BLOOD PRESSURE: 104 MMHG | HEART RATE: 77 BPM | BODY MASS INDEX: 19.51 KG/M2 | DIASTOLIC BLOOD PRESSURE: 66 MMHG | OXYGEN SATURATION: 97 % | WEIGHT: 106 LBS | HEIGHT: 62 IN | TEMPERATURE: 98 F | RESPIRATION RATE: 17 BRPM

## 2023-06-25 DIAGNOSIS — J01.40 SUBACUTE PANSINUSITIS: Primary | ICD-10-CM

## 2023-06-25 PROCEDURE — 99213 PR OFFICE/OUTPT VISIT, EST, LEVL III, 20-29 MIN: ICD-10-PCS | Mod: 25,,, | Performed by: FAMILY MEDICINE

## 2023-06-25 PROCEDURE — 96372 PR INJECTION,THERAP/PROPH/DIAG2ST, IM OR SUBCUT: ICD-10-PCS | Mod: ,,, | Performed by: FAMILY MEDICINE

## 2023-06-25 PROCEDURE — 96372 THER/PROPH/DIAG INJ SC/IM: CPT | Mod: ,,, | Performed by: FAMILY MEDICINE

## 2023-06-25 PROCEDURE — 99213 OFFICE O/P EST LOW 20 MIN: CPT | Mod: 25,,, | Performed by: FAMILY MEDICINE

## 2023-06-25 RX ORDER — DEXAMETHASONE SODIUM PHOSPHATE 100 MG/10ML
4 INJECTION INTRAMUSCULAR; INTRAVENOUS ONCE
Status: COMPLETED | OUTPATIENT
Start: 2023-06-25 | End: 2023-06-25

## 2023-06-25 RX ORDER — AZITHROMYCIN 250 MG/1
TABLET, FILM COATED ORAL
Qty: 6 TABLET | Refills: 0 | Status: SHIPPED | OUTPATIENT
Start: 2023-06-25

## 2023-06-25 RX ADMIN — DEXAMETHASONE SODIUM PHOSPHATE 4 MG: 100 INJECTION INTRAMUSCULAR; INTRAVENOUS at 08:06

## 2023-06-25 NOTE — PROGRESS NOTES
"Subjective:      Patient ID: Tanja Cohen is a 77 y.o. female.    Vitals:  height is 5' 2" (1.575 m) and weight is 48.1 kg (106 lb). Her temperature is 97.6 °F (36.4 °C). Her blood pressure is 104/66 and her pulse is 77. Her respiration is 17 and oxygen saturation is 97%.     Chief Complaint: Cough (Cough, sneeze, runny nose, loss of voice for about 3 weeks. No otc meds taken. )    HPI:  77-year-old female with known history of hypertension and acid reflux currently on medications.  Follows up with primary MD Dr. Pablo.  Present to clinic with concerns of nasal congestion, sinus congestion, postnasal drip coughing and hoarseness of voice on and off for 3 weeks.  Symptoms gradual in onset and worsening.  No concerns of positive exposure to infections.    ROS :  Constitutional : _ , no body aches or headache  Eyes : _No redness, drainage or pain  HENT_sore throat, postnasal drainage  Respiratory_no wheezing, no shortness of breath  Cardiovascular_no chest pain  Gastrointestinal_ denies nausea vomiting or abdominal pain  Musculoskeletal_no joint pain, no joint swelling  Integumentary_no skin rash or abnormal lesion    Objective:     Physical Exam  General : Alert and Oriented, No apparent distress, afebrile, sounds stuffy and congested  Neck - supple  HENT : Oropharynx mild redness no swelling no exudate, bilateral TMs intact mild fluid no redness.  Appears dull and cloudy  Respiratory : Bilateral equal breath sounds, nonlabored respirations  Cardiovascular : Rate, rhythm regular, normal volume pulse, no murmur  Integumentary : Warm, Dry     Assessment:     1. Subacute pansinusitis      Plan:   Cool mist vaporizer decay there was moist and help draining sinuses.  Claritin or Allegra for congestion.  Decadron IM today risk and benefits discussed voiced understanding.  Considering the duration of symptoms and worsening symptoms antibiotics as prescribed.  Call this clinic for any questions    Subacute " pansinusitis  -     dexAMETHasone injection 4 mg  -     azithromycin (Z-ROSETTE) 250 MG tablet; Take 2 tablets by mouth on day 1; Take 1 tablet by mouth on days 2-5  Dispense: 6 tablet; Refill: 0

## 2023-06-25 NOTE — PATIENT INSTRUCTIONS
Cool mist vaporizer decay there was moist and help draining sinuses.  Claritin or Allegra for congestion.  Decadron IM today risk and benefits discussed voiced understanding.  Considering the duration of symptoms and worsening symptoms antibiotics as prescribed.  Call this clinic for any questions

## 2023-12-06 ENCOUNTER — OFFICE VISIT (OUTPATIENT)
Dept: URGENT CARE | Facility: CLINIC | Age: 78
End: 2023-12-06
Payer: MEDICARE

## 2023-12-06 VITALS
SYSTOLIC BLOOD PRESSURE: 159 MMHG | HEIGHT: 62 IN | WEIGHT: 102 LBS | DIASTOLIC BLOOD PRESSURE: 84 MMHG | BODY MASS INDEX: 18.77 KG/M2 | TEMPERATURE: 97 F | OXYGEN SATURATION: 100 % | HEART RATE: 63 BPM | RESPIRATION RATE: 15 BRPM

## 2023-12-06 DIAGNOSIS — S76.011A HIP STRAIN, RIGHT, INITIAL ENCOUNTER: Primary | ICD-10-CM

## 2023-12-06 PROCEDURE — 96372 PR INJECTION,THERAP/PROPH/DIAG2ST, IM OR SUBCUT: ICD-10-PCS | Mod: ,,, | Performed by: FAMILY MEDICINE

## 2023-12-06 PROCEDURE — 99214 OFFICE O/P EST MOD 30 MIN: CPT | Mod: 25,,, | Performed by: FAMILY MEDICINE

## 2023-12-06 PROCEDURE — 96372 THER/PROPH/DIAG INJ SC/IM: CPT | Mod: ,,, | Performed by: FAMILY MEDICINE

## 2023-12-06 PROCEDURE — 99214 PR OFFICE/OUTPT VISIT, EST, LEVL IV, 30-39 MIN: ICD-10-PCS | Mod: 25,,, | Performed by: FAMILY MEDICINE

## 2023-12-06 RX ORDER — CYCLOBENZAPRINE HCL 5 MG
5 TABLET ORAL NIGHTLY
Qty: 7 TABLET | Refills: 0 | Status: SHIPPED | OUTPATIENT
Start: 2023-12-06 | End: 2023-12-06 | Stop reason: SDUPTHER

## 2023-12-06 RX ORDER — DEXAMETHASONE SODIUM PHOSPHATE 100 MG/10ML
5 INJECTION INTRAMUSCULAR; INTRAVENOUS
Status: COMPLETED | OUTPATIENT
Start: 2023-12-06 | End: 2023-12-06

## 2023-12-06 RX ORDER — CYCLOBENZAPRINE HCL 5 MG
5 TABLET ORAL NIGHTLY
Qty: 7 TABLET | Refills: 0 | Status: SHIPPED | OUTPATIENT
Start: 2023-12-06 | End: 2023-12-13

## 2023-12-06 RX ADMIN — DEXAMETHASONE SODIUM PHOSPHATE 5 MG: 100 INJECTION INTRAMUSCULAR; INTRAVENOUS at 09:12

## 2023-12-06 NOTE — PATIENT INSTRUCTIONS
Muscle relaxer at bedtime. Warm compress, massage, and stretch.    Steroid injection given today, discussed side effects.

## 2023-12-06 NOTE — PROGRESS NOTES
"Subjective:      Patient ID: Tanja Cohen is a 77 y.o. female.    Vitals:  height is 5' 2" (1.575 m) and weight is 46.3 kg (102 lb). Her temperature is 97.3 °F (36.3 °C). Her blood pressure is 159/84 (abnormal) and her pulse is 63. Her respiration is 15 and oxygen saturation is 100%.     Chief Complaint: Other Misc (Right leg pain, may have pulled muscle. Hit door yesterday started hurting today. No otc meds taken.)    Yesterday hit her leg on a door now with pain to the R lateral leg mainly started this am.  Worse with walking.  Occasional sharp sensation.  No weakness.          Constitution: Negative for sweating, fatigue and fever.   Respiratory:  Negative for cough.    Gastrointestinal:  Negative for nausea and vomiting.   Musculoskeletal:  Positive for pain, trauma and muscle cramps.   Skin:  Negative for wound and lesion.   Neurological:  Negative for dizziness.      Objective:     Physical Exam   HENT:   Mouth/Throat: Mucous membranes are moist.   Cardiovascular: Normal rate and regular rhythm.   Abdominal: Normal appearance.   Musculoskeletal:      Comments: Pos TTP of L lateral hip.  No anterior hip pain. Neg log roll.     Neurological: no focal deficit. She is alert.   Skin: Skin is warm.   Nursing note and vitals reviewed.      Assessment:     1. Hip strain, right, initial encounter        Plan:       Hip strain, right, initial encounter  -     cyclobenzaprine (FLEXERIL) 5 MG tablet; Take 1 tablet (5 mg total) by mouth nightly. for 7 days  Dispense: 7 tablet; Refill: 0  -     dexAMETHasone injection 5 mg                    "

## 2023-12-20 ENCOUNTER — OFFICE VISIT (OUTPATIENT)
Dept: URGENT CARE | Facility: CLINIC | Age: 78
End: 2023-12-20
Payer: MEDICARE

## 2023-12-20 VITALS
BODY MASS INDEX: 18.77 KG/M2 | SYSTOLIC BLOOD PRESSURE: 121 MMHG | HEART RATE: 85 BPM | RESPIRATION RATE: 18 BRPM | TEMPERATURE: 98 F | DIASTOLIC BLOOD PRESSURE: 64 MMHG | HEIGHT: 62 IN | WEIGHT: 102 LBS | OXYGEN SATURATION: 99 %

## 2023-12-20 DIAGNOSIS — U07.1 COVID-19: Primary | ICD-10-CM

## 2023-12-20 DIAGNOSIS — R05.9 COUGH, UNSPECIFIED TYPE: ICD-10-CM

## 2023-12-20 DIAGNOSIS — U07.1 COVID-19 VIRUS DETECTED: ICD-10-CM

## 2023-12-20 LAB
CTP QC/QA: YES
CTP QC/QA: YES
POC MOLECULAR INFLUENZA A AGN: NEGATIVE
POC MOLECULAR INFLUENZA B AGN: NEGATIVE
SARS-COV-2 RDRP RESP QL NAA+PROBE: POSITIVE

## 2023-12-20 PROCEDURE — 87502 POCT INFLUENZA A/B MOLECULAR: ICD-10-PCS | Mod: QW,,,

## 2023-12-20 PROCEDURE — 99213 PR OFFICE/OUTPT VISIT, EST, LEVL III, 20-29 MIN: ICD-10-PCS | Mod: 25,,,

## 2023-12-20 PROCEDURE — 99213 OFFICE O/P EST LOW 20 MIN: CPT | Mod: 25,,,

## 2023-12-20 PROCEDURE — 87635 SARS-COV-2 COVID-19 AMP PRB: CPT | Mod: QW,,,

## 2023-12-20 PROCEDURE — 87635: ICD-10-PCS | Mod: QW,,,

## 2023-12-20 PROCEDURE — 96372 THER/PROPH/DIAG INJ SC/IM: CPT | Mod: ,,,

## 2023-12-20 PROCEDURE — 96372 PR INJECTION,THERAP/PROPH/DIAG2ST, IM OR SUBCUT: ICD-10-PCS | Mod: ,,,

## 2023-12-20 PROCEDURE — 87502 INFLUENZA DNA AMP PROBE: CPT | Mod: QW,,,

## 2023-12-20 RX ORDER — DEXAMETHASONE SODIUM PHOSPHATE 100 MG/10ML
5 INJECTION INTRAMUSCULAR; INTRAVENOUS ONCE
Status: COMPLETED | OUTPATIENT
Start: 2023-12-20 | End: 2023-12-20

## 2023-12-20 RX ORDER — PROMETHAZINE HYDROCHLORIDE AND DEXTROMETHORPHAN HYDROBROMIDE 6.25; 15 MG/5ML; MG/5ML
5 SYRUP ORAL EVERY 4 HOURS PRN
Qty: 180 ML | Refills: 0 | Status: SHIPPED | OUTPATIENT
Start: 2023-12-20 | End: 2023-12-30

## 2023-12-20 RX ADMIN — DEXAMETHASONE SODIUM PHOSPHATE 5 MG: 100 INJECTION INTRAMUSCULAR; INTRAVENOUS at 10:12

## 2023-12-20 NOTE — PROGRESS NOTES
"Subjective:      Patient ID: Tanja Cohen is a 78 y.o. female.    Vitals:  height is 5' 2" (1.575 m) and weight is 46.3 kg (102 lb). Her temperature is 97.7 °F (36.5 °C). Her blood pressure is 121/64 and her pulse is 85. Her respiration is 18 and oxygen saturation is 99%.     Chief Complaint: Cough (Cough, headache, runny nose x 1 day.)    Patient is a 78-year-old female that presents complaining of cough, headache, runny nose since yesterday. Patient denies any SOB, CP, rash, n/v/d, or neck stiffness.      Cough  Associated symptoms include headaches.       HENT:  Positive for congestion.    Respiratory:  Positive for cough.    Neurological:  Positive for headaches.      Objective:     Physical Exam   Constitutional: She is oriented to person, place, and time.  Non-toxic appearance. She does not appear ill.   HENT:   Ears:   Right Ear: Tympanic membrane, external ear and ear canal normal.   Left Ear: Tympanic membrane, external ear and ear canal normal.   Nose: Congestion present.   Mouth/Throat: Mucous membranes are moist. No posterior oropharyngeal erythema. Oropharynx is clear.   Eyes: Conjunctivae are normal.   Cardiovascular: Normal rate and normal pulses.   Pulmonary/Chest: Effort normal and breath sounds normal.   Abdominal: Normal appearance and bowel sounds are normal. Soft. There is no abdominal tenderness.   Musculoskeletal: Normal range of motion.         General: Normal range of motion.   Neurological: She is alert and oriented to person, place, and time.   Skin: Skin is warm, dry and no rash. Capillary refill takes less than 2 seconds.   Psychiatric: Her behavior is normal. Mood normal.       Assessment:     1. COVID-19    2. Cough, unspecified type           Previous History      Review of patient's allergies indicates:   Allergen Reactions    Cephalexin Hives       Past Medical History:   Diagnosis Date    GERD (gastroesophageal reflux disease)     Hypertension      Current Outpatient " "Medications   Medication Instructions    aspirin 81 mg, Oral, Daily    azithromycin (Z-ROSETTE) 250 MG tablet Take 2 tablets by mouth on day 1; Take 1 tablet by mouth on days 2-5<BR>    lisinopriL-hydrochlorothiazide (PRINZIDE,ZESTORETIC) 20-25 mg Tab 1 tablet, Oral, Daily, for 90 days    molnupiravir 800 mg, Oral, Every 12 hours    omeprazole (PRILOSEC) 20 mg, Oral, Daily    PREMARIN vaginal cream APPLY DIME SIZED AMOUNT TO VAGINAL WALL WITH GLOVED FINGER TWO TIMES A WEEK. (DO NOT USE WITH INTERCOURSE)    promethazine-dextromethorphan (PROMETHAZINE-DM) 6.25-15 mg/5 mL Syrp 5 mLs, Oral, Every 4 hours PRN     Past Surgical History:   Procedure Laterality Date    SURGICAL REMOVAL OF BUNION WITH OSTEOTOMY OF METATARSAL BONE       Family History   Problem Relation Age of Onset    Alcohol abuse Mother     Kidney disease Father     No Known Problems Sister     No Known Problems Brother        Social History     Tobacco Use    Smoking status: Never    Smokeless tobacco: Never   Substance Use Topics    Alcohol use: Not Currently    Drug use: Never        Physical Exam      Vital Signs Reviewed   /64   Pulse 85   Temp 97.7 °F (36.5 °C)   Resp 18   Ht 5' 2" (1.575 m)   Wt 46.3 kg (102 lb)   SpO2 99%   BMI 18.66 kg/m²        Procedures    Procedures     Labs     Results for orders placed or performed in visit on 12/20/23   POCT Influenza A/B MOLECULAR   Result Value Ref Range    POC Molecular Influenza A Ag Negative Negative, Not Reported    POC Molecular Influenza B Ag Negative Negative, Not Reported     Acceptable Yes    POCT COVID-19 Rapid Screening   Result Value Ref Range    POC Rapid COVID Positive (A) Negative     Acceptable Yes       Plan:     Patient was adamant that she does not have COVID because she took all of her shots and only has a cough, explained to her that due to her taking the shots are symptoms or probably less but she was positive.  She also it was very adamant that " she needs a steroid shot.     COVID-19  -     molnupiravir 200 mg capsule (EUA); Take 4 capsules (800 mg total) by mouth every 12 (twelve) hours. for 5 days  Dispense: 40 capsule; Refill: 0    Cough, unspecified type  -     POCT Influenza A/B MOLECULAR  -     POCT COVID-19 Rapid Screening  -     dexAMETHasone injection 5 mg  -     promethazine-dextromethorphan (PROMETHAZINE-DM) 6.25-15 mg/5 mL Syrp; Take 5 mLs by mouth every 4 (four) hours as needed (cough).  Dispense: 180 mL; Refill: 0      COVID-Take Tylenol (acetaminophen) for fever/aches.  Drink plenty of fluids.     Molnupiravir (Lagevrio) is a treatment issued with COVID with-in 5 days of symptom onset to decrease risk of hospitalization and lessen symptom impact. Side effects from this treatment are very rare but may include nausea and diarrhea in 2% of patients.     Take OTC cough/cold/congestion medication such as Dayquil/Nyquil.    May also take antihistamine such as Claritin/Zyrtec/Allegra.  Cepacol sore throat lozenges if needed.     Drink plenty of fluids.  Rest.     Go directly to ER if you experience any SOB, chest pain, or other worrisome symptoms.      If positive for Covid-19, you should stay in isolation until: 1) At least 5 days have passed since your symptoms first appeared and  2) At least 24 hours have passed since recovery defined as resolution of fever without the use of fever-reducing medications and improvement in symptoms

## 2023-12-28 ENCOUNTER — OFFICE VISIT (OUTPATIENT)
Dept: INTERNAL MEDICINE | Facility: CLINIC | Age: 78
End: 2023-12-28
Payer: MEDICAID

## 2023-12-28 VITALS
HEART RATE: 65 BPM | DIASTOLIC BLOOD PRESSURE: 71 MMHG | BODY MASS INDEX: 18.44 KG/M2 | SYSTOLIC BLOOD PRESSURE: 147 MMHG | OXYGEN SATURATION: 97 % | RESPIRATION RATE: 20 BRPM | HEIGHT: 62 IN | TEMPERATURE: 98 F | WEIGHT: 100.19 LBS

## 2023-12-28 DIAGNOSIS — K21.9 GASTROESOPHAGEAL REFLUX DISEASE, UNSPECIFIED WHETHER ESOPHAGITIS PRESENT: ICD-10-CM

## 2023-12-28 DIAGNOSIS — I10 HYPERTENSION, UNSPECIFIED TYPE: ICD-10-CM

## 2023-12-28 PROCEDURE — 99213 OFFICE O/P EST LOW 20 MIN: CPT | Mod: PBBFAC | Performed by: FAMILY MEDICINE

## 2023-12-28 RX ORDER — OMEPRAZOLE 20 MG/1
20 CAPSULE, DELAYED RELEASE ORAL DAILY
Qty: 90 CAPSULE | Refills: 3 | Status: SHIPPED | OUTPATIENT
Start: 2023-12-28

## 2023-12-28 RX ORDER — LISINOPRIL AND HYDROCHLOROTHIAZIDE 20; 25 MG/1; MG/1
1 TABLET ORAL DAILY
Qty: 90 TABLET | Refills: 3 | Status: SHIPPED | OUTPATIENT
Start: 2023-12-28

## 2023-12-28 NOTE — PROGRESS NOTES
[facility] Internal Medicine Resident Clinic    Subjective:        Tanja Cohen is a 78 y.o. female who  has a past medical history of GERD (gastroesophageal reflux disease) and Hypertension.  She presents to clinic today for follow-up of chronic medical conditions.  Patient states she is doing well currently has no complaints.Denies chest pain, SOB, weakness, fatigue, unintentional weight loss, lightheadedness, dizziness, abd pain, n/v, dysuria or peripheral edema.  .    Review of Systems:  10 point ROS negative except for HPI    Objective:   Vital Signs:  Vitals:    12/28/23 0748   BP: (!) 147/71   Pulse: 65   Resp: 20   Temp: 97.6 °F (36.4 °C)        .FLOWAMB[14     Body mass index is 18.33 kg/m².     General:  Well developed, well nourished, no acute respiratory distress  Head: Normocephalic, atraumatic  Eyes: PERRL, EOMI, anicteric sclera  Throat: No posterior pharyngeal erythema or exudate, no tonsillar exudate  Neck: supple, normal ROM, no thyromegaly   CVS:  RRR, S1 and S2 normal, 3/6 systolic murmurs, no added heart sounds, rubs, gallops, 2+ peripheral pulses  Resp:  Lungs clear to auscultation bilaterally, no wheezes, rales, or rhonci  GI:  Abdomen soft, non-tender, non-distended, normoactive bowel sounds  MSK:  No muscle atrophy, cyanosis, peripheral edema, full range of motion  Skin:  No rashes, ulcers, erythema  Neuro:  Alert and oriented x3, No focal neuro deficits, CNII-XII grossly intact  Psych:  Appropriate mood and affect         Laboratory:  Lab Results   Component Value Date    WBC 7.56 06/05/2023    HGB 13.8 06/05/2023    HCT 42.3 06/05/2023     06/05/2023    MCV 87.8 06/05/2023    RDW 12.7 06/05/2023    Lab Results   Component Value Date     06/05/2023    K 3.6 06/05/2023    CO2 28 06/05/2023    BUN 25.8 (H) 06/05/2023    CREATININE 0.78 06/05/2023    CALCIUM 9.0 06/05/2023      Lab Results   Component Value Date    HGBA1C 5.4 06/05/2023    .3 06/05/2023    CREATININE 0.78  06/05/2023    Lab Results   Component Value Date    TSH 1.8123 02/23/2021                .labDM:   Lab Results   Component Value Date    HGBA1C 5.4 06/05/2023    .3 06/05/2023    CREATININE 0.78 06/05/2023     Thyroid:   Lab Results   Component Value Date    TSH 1.8123 02/23/2021     Anemia:   Lab Results   Component Value Date    [id number] 516 02/23/2021       Current Medications:  Current Outpatient Medications   Medication Instructions    aspirin 81 mg, Oral, Daily    azithromycin (Z-ROSETTE) 250 MG tablet Take 2 tablets by mouth on day 1; Take 1 tablet by mouth on days 2-5<BR>    lisinopriL-hydrochlorothiazide (PRINZIDE,ZESTORETIC) 20-25 mg Tab 1 tablet, Oral, Daily, for 90 days    omeprazole (PRILOSEC) 20 mg, Oral, Daily    PREMARIN vaginal cream APPLY DIME SIZED AMOUNT TO VAGINAL WALL WITH GLOVED FINGER TWO TIMES A WEEK. (DO NOT USE WITH INTERCOURSE)    promethazine-dextromethorphan (PROMETHAZINE-DM) 6.25-15 mg/5 mL Syrp 5 mLs, Oral, Every 4 hours PRN        Assessment and Plan:      Hypertension  continue HCTZ-Lisinopril      Vitamin D deficiency  Normal continue OTC supplementation      Dyslipidemia  Patient refuses to be placed on any statins  continue active lifestyle      GERD  currently on PPI therapy  symptoms controlled  continue to monitor      Vaginal Atrophy  f/u in GYN clinic  continue topical estrogen cream     Osteopenia  diagnosed on bone density in 2012  continue weight bearing exercise  continue vitamin d supplementation     Preventative  DM (age>45 or HTN): A1c 5.4 6/2023  Lipid (age>35): cholesterol elevated, declined statin  Osteoporosis: (age>65 or FRAX > 9.3%): declined screenign despite extensive risk/benefit discussion (reportedly she states it wa previously normal)  Syphillis (sexually active): declined  HCV (once if born btwn 6888-6128): declined   HIV (once age 15-65): declined  Lung Ca (30PY smoker age 55-79 or quit in last 15y): n/a  Colon Ca: (age 50-75-annual FOBT, 5y flex +  FOBTq3 or 10y c-scope): last done 2018 recommended 5yr f/u  Mammogram (annual >40 or <10 yr age difference since 1st degree relative diagnosed): last done 6/2022 can consider continued screening, seeing gynecology  Immunizations (generally - flu, shingrix, t-dap, PCV, Covid): uptodate  Health Maintenance   Topic Date Due    Hepatitis C Screening  Never done    DEXA Scan  Never done    TETANUS VACCINE  05/19/2028    Lipid Panel  06/05/2028    Shingles Vaccine  Completed                  Milton Friedman MD

## 2024-06-10 ENCOUNTER — LAB VISIT (OUTPATIENT)
Dept: LAB | Facility: HOSPITAL | Age: 79
End: 2024-06-10
Attending: FAMILY MEDICINE
Payer: MEDICARE

## 2024-06-10 ENCOUNTER — OFFICE VISIT (OUTPATIENT)
Dept: INTERNAL MEDICINE | Facility: CLINIC | Age: 79
End: 2024-06-10
Payer: MEDICARE

## 2024-06-10 VITALS
OXYGEN SATURATION: 99 % | DIASTOLIC BLOOD PRESSURE: 60 MMHG | HEART RATE: 61 BPM | TEMPERATURE: 98 F | BODY MASS INDEX: 18.62 KG/M2 | RESPIRATION RATE: 18 BRPM | SYSTOLIC BLOOD PRESSURE: 100 MMHG | HEIGHT: 62 IN | WEIGHT: 101.19 LBS

## 2024-06-10 DIAGNOSIS — Z13.1 SCREENING FOR DIABETES MELLITUS: Primary | ICD-10-CM

## 2024-06-10 DIAGNOSIS — Z11.59 NEED FOR HEPATITIS B SCREENING TEST: ICD-10-CM

## 2024-06-10 DIAGNOSIS — Z11.59 NEED FOR HEPATITIS C SCREENING TEST: ICD-10-CM

## 2024-06-10 DIAGNOSIS — Z11.4 SCREENING FOR HIV (HUMAN IMMUNODEFICIENCY VIRUS): ICD-10-CM

## 2024-06-10 DIAGNOSIS — I10 HYPERTENSION, UNSPECIFIED TYPE: ICD-10-CM

## 2024-06-10 DIAGNOSIS — R79.89 OTHER SPECIFIED ABNORMAL FINDINGS OF BLOOD CHEMISTRY: ICD-10-CM

## 2024-06-10 DIAGNOSIS — M85.80 OSTEOPENIA, UNSPECIFIED LOCATION: ICD-10-CM

## 2024-06-10 DIAGNOSIS — Z13.1 SCREENING FOR DIABETES MELLITUS: ICD-10-CM

## 2024-06-10 LAB
25(OH)D3+25(OH)D2 SERPL-MCNC: 52 NG/ML (ref 30–80)
ALBUMIN SERPL-MCNC: 3.6 G/DL (ref 3.4–4.8)
ALBUMIN/GLOB SERPL: 1 RATIO (ref 1.1–2)
ALP SERPL-CCNC: 60 UNIT/L (ref 40–150)
ALT SERPL-CCNC: 9 UNIT/L (ref 0–55)
ANION GAP SERPL CALC-SCNC: 9 MEQ/L
AST SERPL-CCNC: 17 UNIT/L (ref 5–34)
BILIRUB SERPL-MCNC: 0.4 MG/DL
BUN SERPL-MCNC: 10.2 MG/DL (ref 9.8–20.1)
CALCIUM SERPL-MCNC: 9.1 MG/DL (ref 8.4–10.2)
CHLORIDE SERPL-SCNC: 106 MMOL/L (ref 98–107)
CO2 SERPL-SCNC: 26 MMOL/L (ref 23–31)
CREAT SERPL-MCNC: 0.74 MG/DL (ref 0.55–1.02)
CREAT/UREA NIT SERPL: 14
EST. AVERAGE GLUCOSE BLD GHB EST-MCNC: 116.9 MG/DL
GFR SERPLBLD CREATININE-BSD FMLA CKD-EPI: >60 ML/MIN/1.73/M2
GLOBULIN SER-MCNC: 3.7 GM/DL (ref 2.4–3.5)
GLUCOSE SERPL-MCNC: 89 MG/DL (ref 82–115)
HAV IGM SERPL QL IA: NONREACTIVE
HBA1C MFR BLD: 5.7 %
HBV CORE IGM SERPL QL IA: NONREACTIVE
HBV SURFACE AG SERPL QL IA: NONREACTIVE
HCV AB SERPL QL IA: NONREACTIVE
HIV 1+2 AB+HIV1 P24 AG SERPL QL IA: NONREACTIVE
POTASSIUM SERPL-SCNC: 3.9 MMOL/L (ref 3.5–5.1)
PROT SERPL-MCNC: 7.3 GM/DL (ref 5.8–7.6)
SODIUM SERPL-SCNC: 141 MMOL/L (ref 136–145)

## 2024-06-10 PROCEDURE — 80074 ACUTE HEPATITIS PANEL: CPT

## 2024-06-10 PROCEDURE — 80053 COMPREHEN METABOLIC PANEL: CPT

## 2024-06-10 PROCEDURE — 36415 COLL VENOUS BLD VENIPUNCTURE: CPT

## 2024-06-10 PROCEDURE — 99214 OFFICE O/P EST MOD 30 MIN: CPT | Mod: PBBFAC | Performed by: FAMILY MEDICINE

## 2024-06-10 PROCEDURE — 82306 VITAMIN D 25 HYDROXY: CPT

## 2024-06-10 PROCEDURE — 83036 HEMOGLOBIN GLYCOSYLATED A1C: CPT

## 2024-06-10 PROCEDURE — 87389 HIV-1 AG W/HIV-1&-2 AB AG IA: CPT

## 2024-06-10 RX ORDER — LISINOPRIL AND HYDROCHLOROTHIAZIDE 10; 12.5 MG/1; MG/1
1 TABLET ORAL DAILY
Qty: 90 TABLET | Refills: 3 | Status: SHIPPED | OUTPATIENT
Start: 2024-06-10 | End: 2025-06-10

## 2024-06-10 NOTE — PROGRESS NOTES
[facility] Internal Medicine Resident Clinic    Subjective:        Tanja Cohen is a 78 y.o. female who  has a past medical history of GERD (gastroesophageal reflux disease) and Hypertension.  She presents to clinic today for follow-up of chronic medical conditions.  Patient states she is doing well currently has no complaints. She works out every morning, and takes care of her SO with multiple medical issues at 430 AM. She measures her BP in the gym every morning and it tends to be 90s/60s. Denies chest pain, SOB, weakness, fatigue, unintentional weight loss, lightheadedness, dizziness, abd pain, n/v, dysuria or peripheral edema.  .    Review of Systems:  10 point ROS negative except for HPI    Objective:   Vital Signs:  Vitals:    06/10/24 0820   BP: 100/60   Pulse:    Resp:    Temp:         Body mass index is 18.51 kg/m².     General:  Well developed, well nourished, no acute respiratory distress  Head: Normocephalic, atraumatic  Eyes: PERRL, EOMI, anicteric sclera  Throat: No posterior pharyngeal erythema or exudate, no tonsillar exudate  Neck: supple, normal ROM, no thyromegaly   CVS:  RRR, S1 and S2 normal, 3/6 systolic murmurs, no added heart sounds, rubs, gallops, 2+ peripheral pulses  Resp:  Lungs clear to auscultation bilaterally, no wheezes, rales, or rhonci  GI:  Abdomen soft, non-tender, non-distended, normoactive bowel sounds  MSK:  No muscle atrophy, cyanosis, peripheral edema, full range of motion  Skin:  No rashes, ulcers, erythema  Neuro:  Alert and oriented x3, No focal neuro deficits, CNII-XII grossly intact  Psych:  Appropriate mood and affect         Laboratory:  Lab Results   Component Value Date    WBC 7.56 06/05/2023    HGB 13.8 06/05/2023    HCT 42.3 06/05/2023     06/05/2023    MCV 87.8 06/05/2023    RDW 12.7 06/05/2023    Lab Results   Component Value Date     06/05/2023    K 3.6 06/05/2023     06/05/2023    CO2 28 06/05/2023    BUN 25.8 (H) 06/05/2023    CREATININE 0.78  06/05/2023    CALCIUM 9.0 06/05/2023      Lab Results   Component Value Date    HGBA1C 5.4 06/05/2023    .3 06/05/2023    CREATININE 0.78 06/05/2023    Lab Results   Component Value Date    TSH 1.8123 02/23/2021                .labDM:   Lab Results   Component Value Date    HGBA1C 5.4 06/05/2023    .3 06/05/2023    CREATININE 0.78 06/05/2023     Thyroid:   Lab Results   Component Value Date    TSH 1.8123 02/23/2021     Anemia:   Lab Results   Component Value Date    [id number] 516 02/23/2021       Current Medications:  Current Outpatient Medications   Medication Instructions    aspirin 81 mg, Oral, Daily    azithromycin (Z-ROSETTE) 250 MG tablet Take 2 tablets by mouth on day 1; Take 1 tablet by mouth on days 2-5<BR>    lisinopriL-hydrochlorothiazide (PRINZIDE,ZESTORETIC) 10-12.5 mg per tablet 1 tablet, Oral, Daily    omeprazole (PRILOSEC) 20 mg, Oral, Daily    PREMARIN vaginal cream APPLY DIME SIZED AMOUNT TO VAGINAL WALL WITH GLOVED FINGER TWO TIMES A WEEK. (DO NOT USE WITH INTERCOURSE)        Assessment and Plan:      Hypertension  continue HCTZ-Lisinopril but decrease to half the dose, as pt is currently taking only half a pill daily      Vitamin D deficiency  Normal continue OTC supplementation      Dyslipidemia  Patient refuses to be placed on any statins  continue active lifestyle      GERD  currently on PPI therapy  symptoms controlled  continue to monitor      Vaginal Atrophy  f/u in GYN clinic  continue topical estrogen cream     Osteopenia  diagnosed on bone density in 2012  continue weight bearing exercise  continue vitamin d supplementation  Labs ordered     Preventative  DM (age>45 or HTN): A1c 5.4 6/2023  Lipid (age>35): cholesterol elevated, declined statin  Osteoporosis: (age>65 or FRAX > 9.3%): declined screenign despite extensive risk/benefit discussion (reportedly she states it was previously normal)  Syphillis (sexually active): declined  HCV (once if born btwn 3790-8579): ordered  today  HIV (once age 15-65): ordered  Lung Ca (30PY smoker age 55-79 or quit in last 15y): n/a  Colon Ca: (age 50-75-annual FOBT, 5y flex + FOBTq3 or 10y c-scope): last done 2018 recommended 5yr f/u  Mammogram (annual >40 or <10 yr age difference since 1st degree relative diagnosed): last done 6/2022 can consider continued screening, seeing gynecology  Immunizations (generally - flu, shingrix, t-dap, PCV, Covid): uptodate  Health Maintenance   Topic Date Due    Hepatitis C Screening  Never done    DEXA Scan  Never done    TETANUS VACCINE  05/19/2028    Lipid Panel  06/05/2028    Shingles Vaccine  Completed        Addison Licona MD - PGY3  [facility]

## 2024-06-21 ENCOUNTER — TELEPHONE (OUTPATIENT)
Dept: GYNECOLOGY | Facility: CLINIC | Age: 79
End: 2024-06-21
Payer: MEDICARE

## 2024-06-21 DIAGNOSIS — B37.31 YEAST VAGINITIS: Primary | ICD-10-CM

## 2024-06-21 RX ORDER — FLUCONAZOLE 150 MG/1
150 TABLET ORAL ONCE
Qty: 1 TABLET | Refills: 0 | Status: SHIPPED | OUTPATIENT
Start: 2024-06-21 | End: 2024-06-21

## 2024-06-21 NOTE — PROGRESS NOTES
Pt came to clinic requesting a refill of Diflucan from many years ago per different provider. Pt c/o vaginal itching. Will treat for yeast, she has annual next week and will f/u at that time.

## 2024-06-26 ENCOUNTER — OFFICE VISIT (OUTPATIENT)
Dept: GYNECOLOGY | Facility: CLINIC | Age: 79
End: 2024-06-26
Payer: MEDICARE

## 2024-06-26 VITALS
HEART RATE: 68 BPM | BODY MASS INDEX: 18.22 KG/M2 | OXYGEN SATURATION: 100 % | RESPIRATION RATE: 20 BRPM | WEIGHT: 99 LBS | SYSTOLIC BLOOD PRESSURE: 125 MMHG | DIASTOLIC BLOOD PRESSURE: 70 MMHG | TEMPERATURE: 98 F | HEIGHT: 62 IN

## 2024-06-26 DIAGNOSIS — N95.2 ATROPHIC VAGINITIS: ICD-10-CM

## 2024-06-26 DIAGNOSIS — Z12.31 VISIT FOR SCREENING MAMMOGRAM: ICD-10-CM

## 2024-06-26 DIAGNOSIS — Z12.11 COLON CANCER SCREENING: ICD-10-CM

## 2024-06-26 DIAGNOSIS — Z01.419 ENCOUNTER FOR ANNUAL ROUTINE GYNECOLOGICAL EXAMINATION: Primary | ICD-10-CM

## 2024-06-26 PROCEDURE — 1101F PT FALLS ASSESS-DOCD LE1/YR: CPT | Mod: CPTII,,, | Performed by: NURSE PRACTITIONER

## 2024-06-26 PROCEDURE — 3288F FALL RISK ASSESSMENT DOCD: CPT | Mod: CPTII,,, | Performed by: NURSE PRACTITIONER

## 2024-06-26 PROCEDURE — 99214 OFFICE O/P EST MOD 30 MIN: CPT | Mod: PBBFAC | Performed by: NURSE PRACTITIONER

## 2024-06-26 PROCEDURE — 1159F MED LIST DOCD IN RCRD: CPT | Mod: CPTII,,, | Performed by: NURSE PRACTITIONER

## 2024-06-26 PROCEDURE — G0101 CA SCREEN;PELVIC/BREAST EXAM: HCPCS | Mod: S$PBB,,, | Performed by: NURSE PRACTITIONER

## 2024-06-26 PROCEDURE — 3074F SYST BP LT 130 MM HG: CPT | Mod: CPTII,,, | Performed by: NURSE PRACTITIONER

## 2024-06-26 PROCEDURE — G0101 CA SCREEN;PELVIC/BREAST EXAM: HCPCS | Mod: PBBFAC | Performed by: NURSE PRACTITIONER

## 2024-06-26 PROCEDURE — 3078F DIAST BP <80 MM HG: CPT | Mod: CPTII,,, | Performed by: NURSE PRACTITIONER

## 2024-06-26 RX ORDER — FLUCONAZOLE 150 MG/1
150 TABLET ORAL ONCE
COMMUNITY
Start: 2024-06-21 | End: 2024-06-26

## 2024-06-26 RX ORDER — CONJUGATED ESTROGENS 0.62 MG/G
CREAM VAGINAL
Qty: 30 G | Refills: 12 | Status: SHIPPED | OUTPATIENT
Start: 2024-06-26

## 2024-06-26 NOTE — PROGRESS NOTES
"  [facility] -  Gynecology / Women's Health Clinic      Subjective:       Patient ID: Tanja Cohen is a 78 y.o. female.    Chief Complaint:  Gynecologic Exam    History of Present Illness  Pt is  here for annual gyn exam. Denies hx of mod/severe dysplasia. Pt is postmenopausal. Last pap--NIL. Denies vaginal bleeding or discharge. Denies abd/pelvic pain. Currently using Premarin cream 2x/week for atrophic vaginitis. Denies urinary complaints. MG-22-BIRADS 1. Denies tobacco use. Denies fly hx of breast, ovarian, uterine or colon cancer. Hx of osteopenia and declines DEXA. Pt states she rides bike and swims and weight lifting 3 days/week. States she gets her calcium through diet and vitamin D in sun. Colonoscopy in 2018, f/u in 5 years. Followed by IM for primary care.     GYN & OB History  No LMP recorded. Patient is postmenopausal.       Review of patient's allergies indicates:   Allergen Reactions    Cephalexin Hives     Past Medical History:   Diagnosis Date    GERD (gastroesophageal reflux disease)     Hypertension      OB History    Para Term  AB Living   2 0     2     SAB IAB Ectopic Multiple Live Births                  # Outcome Date GA Lbr Jose R/2nd Weight Sex Type Anes PTL Lv   2 AB 1972           1 AB 1968                Review of Systems  Review of Systems    Negative except for pertinent findings for positives per HPI     Objective:    Physical Exam    /70 (BP Location: Left arm, Patient Position: Sitting, BP Method: Medium (Automatic))   Pulse 68   Temp 97.7 °F (36.5 °C) (Oral)   Resp 20   Ht 5' 2" (1.575 m)   Wt 44.9 kg (99 lb)   SpO2 100%   BMI 18.11 kg/m²   GENERAL: Well-developed female. No acute distress.    SKIN: Normal to inspection, warm and intact.  BREASTS: No rashes or erythema. No masses, lumps, discharge, tenderness.  VULVA: General appearance normal; external genitalia with no lesions or erythema.  VAGINA: Mucosa/vaginal vault " atrophic no abnormal discharge or lesions.  CERVIX: atrophic, nulliparous appearing os, no erythema or abnormal discharge.  BIMANUAL EXAM: reveals a 6 week-sized uterus. The uterus is non tender. Theo adnexa reveal no tenderness.  PSYCHIATRIC: Patient is oriented to person, place, and time. Mood and affect are normal.    Assessment:           ICD-10-CM ICD-9-CM   1. Encounter for annual routine gynecological examination  Z01.419 V72.31   2. Atrophic vaginitis  N95.2 627.3   3. Visit for screening mammogram  Z12.31 V76.12   4. Colon cancer screening  Z12.11 V76.51     Plan:   Tanja was seen today for gynecologic exam.    Diagnoses and all orders for this visit:    Encounter for annual routine gynecological examination    Atrophic vaginitis  -     PREMARIN vaginal cream; APPLY DIME SIZED AMOUNT TO VAGINAL WALL WITH GLOVED FINGER TWO TIMES A WEEK. (DO NOT USE WITH INTERCOURSE)    Visit for screening mammogram  -     Mammo Digital Screening Bilat w/ Dae; Future    Colon cancer screening  -     Ambulatory referral/consult to Endo Procedure ; Future    Pelvic today, pap deferred d/t age over 65 and no dysplasia  MG ordered  Colonoscopy referral, request here at [facility]  Continue Premarin cream 2x/week  Follow up in about 1 year (around 6/26/2025) for annual exam.

## 2024-07-08 ENCOUNTER — OFFICE VISIT (OUTPATIENT)
Dept: URGENT CARE | Facility: CLINIC | Age: 79
End: 2024-07-08
Payer: MEDICARE

## 2024-07-08 VITALS
RESPIRATION RATE: 18 BRPM | HEART RATE: 68 BPM | HEIGHT: 62 IN | TEMPERATURE: 98 F | WEIGHT: 99 LBS | DIASTOLIC BLOOD PRESSURE: 76 MMHG | BODY MASS INDEX: 18.22 KG/M2 | OXYGEN SATURATION: 100 % | SYSTOLIC BLOOD PRESSURE: 154 MMHG

## 2024-07-08 DIAGNOSIS — R21 RASH: ICD-10-CM

## 2024-07-08 DIAGNOSIS — M25.571 ACUTE RIGHT ANKLE PAIN: Primary | ICD-10-CM

## 2024-07-08 RX ORDER — CYCLOBENZAPRINE HCL 10 MG
10 TABLET ORAL 3 TIMES DAILY PRN
Qty: 15 TABLET | Refills: 0 | Status: SHIPPED | OUTPATIENT
Start: 2024-07-08 | End: 2024-07-18

## 2024-07-08 RX ORDER — DEXAMETHASONE SODIUM PHOSPHATE 100 MG/10ML
8 INJECTION INTRAMUSCULAR; INTRAVENOUS
Status: COMPLETED | OUTPATIENT
Start: 2024-07-08 | End: 2024-07-08

## 2024-07-08 RX ADMIN — DEXAMETHASONE SODIUM PHOSPHATE 8 MG: 100 INJECTION INTRAMUSCULAR; INTRAVENOUS at 08:07

## 2024-07-08 NOTE — PATIENT INSTRUCTIONS
Preliminary read of x-ray normal, we will follow-up with radiologist read when available.  Medication to pharmacy  Rest your ankle, may apply for 10 minutes a couple times a day  Elevate your ankle  Do not do any activity that makes the pain worse  Alternate over-the-counter Tylenol or Motrin as needed  Call or seek medical attention if you experience worsening pain, any change in symptoms, or if any questions arise while at home.

## 2024-07-08 NOTE — PROGRESS NOTES
"Subjective:      Patient ID: Tanja Cohen is a 78 y.o. female.    Vitals:  height is 5' 2" (1.575 m) and weight is 44.9 kg (99 lb). Her temperature is 97.7 °F (36.5 °C). Her blood pressure is 154/76 (abnormal) and her pulse is 68. Her respiration is 18 and oxygen saturation is 100%.     Chief Complaint: Rash and Ankle Injury    Patient is a 78 y.o. female who presents to urgent care with complaints of rash located to both arms, rt leg-near ankle. Rash concentrated to the left medial wrist, and forearm  x Saturday. Itchy, unmitigated with Sarna lotion.  Denies fever, or exudate. Additional complaint of Ankle injury x 2 weeks ago, mitigated with tylenol and ibuprofen. Rolled ankle--hurts with ROM, however, able to bear weight.          Constitution: Negative for chills, sweating, fatigue and fever.   HENT: Negative.     Neck: neck negative.   Cardiovascular: Negative.    Eyes: Negative.    Respiratory: Negative.     Gastrointestinal: Negative.    Endocrine: negative.   Genitourinary: Negative.    Musculoskeletal: Negative.  Positive for pain.   Skin:  Positive for rash. Negative for erythema.   Allergic/Immunologic: Negative.    Neurological: Negative.  Negative for disorientation and altered mental status.   Hematologic/Lymphatic: Negative.    Psychiatric/Behavioral:  Negative for altered mental status, disorientation and confusion.       Objective:     Physical Exam   Constitutional: She is oriented to person, place, and time. She appears well-developed.   HENT:   Head: Normocephalic and atraumatic. Head is without abrasion, without contusion and without laceration.   Ears:   Right Ear: External ear normal.   Left Ear: External ear normal.   Nose: Nose normal.   Mouth/Throat: Oropharynx is clear and moist and mucous membranes are normal.   Eyes: Conjunctivae, EOM and lids are normal. Pupils are equal, round, and reactive to light.   Neck: Trachea normal and phonation normal. Neck supple.   Cardiovascular: Normal " rate, regular rhythm and normal heart sounds.   Pulmonary/Chest: Effort normal and breath sounds normal. No stridor. No respiratory distress.   Musculoskeletal: Normal range of motion.         General: Normal range of motion.      Comments: Right ankle with full range of motion actively and passively no tenderness with palpation, no bruising or swelling   Neurological: She is alert and oriented to person, place, and time.   Skin: Skin is warm, dry, intact and no rash. Capillary refill takes less than 2 seconds. No abrasion, No burn, No bruising, No erythema and No ecchymosis         Comments: Red maculopapular rash to left forearm and wrist with excoriation.  No blisters or drainage, no tunneling, no swelling   Psychiatric: Her speech is normal and behavior is normal. Judgment and thought content normal.   Nursing note and vitals reviewed.         Previous History      Review of patient's allergies indicates:   Allergen Reactions    Cephalexin Hives       Past Medical History:   Diagnosis Date    GERD (gastroesophageal reflux disease)     Hypertension      Current Outpatient Medications   Medication Instructions    aspirin 81 mg, Oral, Daily    azithromycin (Z-ROSETTE) 250 MG tablet Take 2 tablets by mouth on day 1; Take 1 tablet by mouth on days 2-5<BR>    cyclobenzaprine (FLEXERIL) 10 mg, Oral, 3 times daily PRN    lisinopriL-hydrochlorothiazide (PRINZIDE,ZESTORETIC) 10-12.5 mg per tablet 1 tablet, Oral, Daily    omeprazole (PRILOSEC) 20 mg, Oral, Daily    PREMARIN vaginal cream APPLY DIME SIZED AMOUNT TO VAGINAL WALL WITH GLOVED FINGER TWO TIMES A WEEK. (DO NOT USE WITH INTERCOURSE)     Past Surgical History:   Procedure Laterality Date    SURGICAL REMOVAL OF BUNION WITH OSTEOTOMY OF METATARSAL BONE       Family History   Problem Relation Name Age of Onset    Alcohol abuse Mother      Kidney disease Father      No Known Problems Sister      No Known Problems Brother         Social History     Tobacco Use    Smoking  "status: Never     Passive exposure: Never    Smokeless tobacco: Never   Substance Use Topics    Alcohol use: Not Currently    Drug use: Never        Physical Exam      Vital Signs Reviewed   BP (!) 154/76   Pulse 68   Temp 97.7 °F (36.5 °C)   Resp 18   Ht 5' 2" (1.575 m)   Wt 44.9 kg (99 lb)   SpO2 100%   BMI 18.11 kg/m²        Procedures    Procedures     Labs     Results for orders placed or performed in visit on 06/10/24   Hepatitis Panel, Acute   Result Value Ref Range    Hep A IgM Interp Nonreactive Nonreactive    Hep B Core IgM Interp Nonreactive Nonreactive    Hep BsAg Interp Nonreactive Nonreactive    Hep C Ab Interp Nonreactive Nonreactive   Hemoglobin A1C   Result Value Ref Range    Hemoglobin A1c 5.7 <=7.0 %    Estimated Average Glucose 116.9 mg/dL   HIV 1/2 Ag/Ab (4th Gen)   Result Value Ref Range    HIV Nonreactive Nonreactive   Comprehensive Metabolic Panel   Result Value Ref Range    Sodium 141 136 - 145 mmol/L    Potassium 3.9 3.5 - 5.1 mmol/L    Chloride 106 98 - 107 mmol/L    CO2 26 23 - 31 mmol/L    Glucose 89 82 - 115 mg/dL    Blood Urea Nitrogen 10.2 9.8 - 20.1 mg/dL    Creatinine 0.74 0.55 - 1.02 mg/dL    Calcium 9.1 8.4 - 10.2 mg/dL    Protein Total 7.3 5.8 - 7.6 gm/dL    Albumin 3.6 3.4 - 4.8 g/dL    Globulin 3.7 (H) 2.4 - 3.5 gm/dL    Albumin/Globulin Ratio 1.0 (L) 1.1 - 2.0 ratio    Bilirubin Total 0.4 <=1.5 mg/dL    ALP 60 40 - 150 unit/L    ALT 9 0 - 55 unit/L    AST 17 5 - 34 unit/L    eGFR >60 mL/min/1.73/m2    Anion Gap 9.0 mEq/L    BUN/Creatinine Ratio 14    Vitamin D   Result Value Ref Range    Vitamin D 52 30 - 80 ng/mL       Assessment:     1. Acute right ankle pain    2. Rash        Plan:   Preliminary read of x-ray normal, we will follow-up with radiologist read when available.  Medication to pharmacy  Rest your ankle, may apply for 10 minutes a couple times a day  Elevate your ankle  Do not do any activity that makes the pain worse  Alternate over-the-counter Tylenol or " Motrin as needed  Call or seek medical attention if you experience worsening pain, any change in symptoms, or if any questions arise while at home.      Acute right ankle pain  -     X-Ray Ankle 2 View Right; Future; Expected date: 07/08/2024    Rash    Other orders  -     dexAMETHasone injection 8 mg  -     cyclobenzaprine (FLEXERIL) 10 MG tablet; Take 1 tablet (10 mg total) by mouth 3 (three) times daily as needed for Muscle spasms.  Dispense: 15 tablet; Refill: 0

## 2024-10-07 DIAGNOSIS — Z12.11 ENCOUNTER FOR SCREENING COLONOSCOPY: Primary | ICD-10-CM

## 2024-10-07 RX ORDER — POLYETHYLENE GLYCOL 3350, SODIUM SULFATE, SODIUM CHLORIDE, POTASSIUM CHLORIDE, SODIUM ASCORBATE, AND ASCORBIC ACID 7.5-2.691G
2000 KIT ORAL ONCE
Qty: 1 KIT | Refills: 0 | Status: SHIPPED | OUTPATIENT
Start: 2024-10-07 | End: 2024-10-07

## 2024-11-06 ENCOUNTER — ANESTHESIA EVENT (OUTPATIENT)
Dept: ENDOSCOPY | Facility: HOSPITAL | Age: 79
End: 2024-11-06
Payer: MEDICARE

## 2024-11-06 NOTE — ANESTHESIA PREPROCEDURE EVALUATION
"                                                                                                             11/06/2024  Tanja Cohen is a 78 y.o., female for [facility]      Vitals:    11/07/24 1051   BP: (!) 179/90   Pulse: 74   Resp: 18   Temp: 36.6 °C (97.8 °F)   SpO2: 100%   Weight: 46.7 kg (103 lb)   Height: 5' 2" (1.575 m)       PMH of HTN, GERD, Osteopenia       Active Ambulatory Problems     Diagnosis Date Noted    Hypertension 12/27/2022    Vitamin D deficiency 12/27/2022    Dyslipidemia 12/27/2022    Gastroesophageal reflux disease 12/27/2022    Vaginal atrophy 12/27/2022    Osteopenia 12/27/2022     Resolved Ambulatory Problems     Diagnosis Date Noted    No Resolved Ambulatory Problems     Past Medical History:   Diagnosis Date    GERD (gastroesophageal reflux disease)        Past Surgical History:   Procedure Laterality Date    SURGICAL REMOVAL OF BUNION WITH OSTEOTOMY OF METATARSAL BONE         Lab Results   Component Value Date    WBC 7.56 06/05/2023    HGB 13.8 06/05/2023    HCT 42.3 06/05/2023     06/05/2023    CHOL 238 (H) 06/05/2023    TRIG 83 06/05/2023    HDL 63 (H) 06/05/2023    ALT 9 06/10/2024    AST 17 06/10/2024     06/10/2024    K 3.9 06/10/2024     06/10/2024    CREATININE 0.74 06/10/2024    BUN 10.2 06/10/2024    CO2 26 06/10/2024    TSH 1.8123 02/23/2021    HGBA1C 5.7 06/10/2024     Scheduled Meds:  Continuous Infusions:  PRN Meds:.    No current facility-administered medications on file prior to encounter.     Current Outpatient Medications on File Prior to Encounter   Medication Sig Dispense Refill    aspirin 81 MG Chew Take 1 tablet (81 mg total) by mouth once daily. 90 tablet 3    lisinopriL-hydrochlorothiazide (PRINZIDE,ZESTORETIC) 10-12.5 mg per tablet Take 1 tablet by mouth once daily. 90 tablet 3    omeprazole (PRILOSEC) 20 MG capsule Take 1 capsule (20 mg total) by mouth once daily. 90 capsule 3    PREMARIN vaginal cream APPLY DIME SIZED AMOUNT TO VAGINAL WALL " WITH GLOVED FINGER TWO TIMES A WEEK. (DO NOT USE WITH INTERCOURSE) 30 g 12    azithromycin (Z-ROSETTE) 250 MG tablet Take 2 tablets by mouth on day 1; Take 1 tablet by mouth on days 2-5 (Patient not taking: Reported on 12/6/2023) 6 tablet 0           Pre-op Assessment    I have reviewed the Patient Summary Reports.     I have reviewed the Nursing Notes. I have reviewed the NPO Status.   I have reviewed the Medications.     Review of Systems  Anesthesia Hx:  No problems with previous Anesthesia   History of prior surgery of interest to airway management or planning:          Denies Family Hx of Anesthesia complications.    Denies Personal Hx of Anesthesia complications.                    Hematology/Oncology:  Hematology Normal   Oncology Normal                                   EENT/Dental:  EENT/Dental Normal           Cardiovascular:  Cardiovascular Normal                                              Pulmonary:  Pulmonary Normal                       Renal/:  Renal/ Normal                 Hepatic/GI:  Hepatic/GI Normal                    Musculoskeletal:  Musculoskeletal Normal                Neurological:  Neurology Normal                                      Endocrine:  Endocrine Normal            Dermatological:  Skin Normal    Psych:  Psychiatric Normal                    Physical Exam  General: Well nourished, Cooperative, Alert and Oriented    Airway:  Mallampati: I / I  Mouth Opening: Normal  TM Distance: Normal  Tongue: Normal  Neck ROM: Normal ROM    Dental:  Partial Dentures        Anesthesia Plan  Type of Anesthesia, risks & benefits discussed:    Anesthesia Type: Gen Natural Airway  Intra-op Monitoring Plan: Standard ASA Monitors  Post Op Pain Control Plan: IV/PO Opioids PRN  (medical reason for not using multimodal pain management)  Induction:  IV  Informed Consent: Informed consent signed with the Patient and all parties understand the risks and agree with anesthesia plan.  All questions answered.  Patient consented to blood products? No  ASA Score: 3  Day of Surgery Review of History & Physical: H&P Update referred to the surgeon/provider.    Ready For Surgery From Anesthesia Perspective.     .

## 2024-11-07 ENCOUNTER — ANESTHESIA (OUTPATIENT)
Dept: ENDOSCOPY | Facility: HOSPITAL | Age: 79
End: 2024-11-07
Payer: MEDICARE

## 2024-11-07 ENCOUNTER — HOSPITAL ENCOUNTER (OUTPATIENT)
Facility: HOSPITAL | Age: 79
Discharge: HOME | End: 2024-11-07
Attending: INTERNAL MEDICINE | Admitting: INTERNAL MEDICINE
Payer: MEDICARE

## 2024-11-07 DIAGNOSIS — K57.30 DIVERTICULOSIS LARGE INTESTINE W/O PERFORATION OR ABSCESS W/O BLEEDING: Primary | ICD-10-CM

## 2024-11-07 PROCEDURE — G0105 COLORECTAL SCRN; HI RISK IND: HCPCS | Mod: ,,, | Performed by: INTERNAL MEDICINE

## 2024-11-07 PROCEDURE — 37000008 HC ANESTHESIA 1ST 15 MINUTES: Performed by: INTERNAL MEDICINE

## 2024-11-07 PROCEDURE — G0105 COLORECTAL SCRN; HI RISK IND: HCPCS | Performed by: INTERNAL MEDICINE

## 2024-11-07 PROCEDURE — 37000009 HC ANESTHESIA EA ADD 15 MINS: Performed by: INTERNAL MEDICINE

## 2024-11-07 PROCEDURE — 63600175 PHARM REV CODE 636 W HCPCS: Performed by: NURSE ANESTHETIST, CERTIFIED REGISTERED

## 2024-11-07 RX ORDER — LIDOCAINE HYDROCHLORIDE 10 MG/ML
1 INJECTION, SOLUTION EPIDURAL; INFILTRATION; INTRACAUDAL; PERINEURAL ONCE
OUTPATIENT
Start: 2024-11-07 | End: 2024-11-07

## 2024-11-07 RX ORDER — PROPOFOL 10 MG/ML
VIAL (ML) INTRAVENOUS
Status: DISCONTINUED | OUTPATIENT
Start: 2024-11-07 | End: 2024-11-07

## 2024-11-07 RX ORDER — LIDOCAINE HYDROCHLORIDE 20 MG/ML
INJECTION, SOLUTION EPIDURAL; INFILTRATION; INTRACAUDAL; PERINEURAL
Status: DISCONTINUED | OUTPATIENT
Start: 2024-11-07 | End: 2024-11-07

## 2024-11-07 RX ADMIN — PROPOFOL 30 MG: 10 INJECTION, EMULSION INTRAVENOUS at 01:11

## 2024-11-07 RX ADMIN — PROPOFOL 50 MG: 10 INJECTION, EMULSION INTRAVENOUS at 12:11

## 2024-11-07 RX ADMIN — PROPOFOL 40 MG: 10 INJECTION, EMULSION INTRAVENOUS at 01:11

## 2024-11-07 RX ADMIN — LIDOCAINE HYDROCHLORIDE 40 MG: 20 INJECTION, SOLUTION EPIDURAL; INFILTRATION; INTRACAUDAL; PERINEURAL at 12:11

## 2024-11-07 RX ADMIN — PROPOFOL 40 MG: 10 INJECTION, EMULSION INTRAVENOUS at 12:11

## 2024-11-07 NOTE — PROVATION PATIENT INSTRUCTIONS
Discharge Summary/Instructions after an Endoscopic Procedure  Patient Name: Tanja Crandall MRN: [id number]  Patient YOB: 1945 Thursday, November 7, 2024  Hugh Yarbrough MD  Dear patient,  As a result of recent federal legislation (The Federal Cures Act), you may   receive lab or pathology results from your procedure in your [facility]   account before your physician is able to contact you. Your physician or   their representative will relay the results to you with their   recommendations at their soonest availability.  Thank you,  RESTRICTIONS:  During your procedure today, you received medications for sedation.  These   medications may affect your judgment, balance and coordination.  Therefore,   for 24 hours, you have the following restrictions:   - DO NOT drive a car, operate machinery, make legal/financial decisions,   sign important papers or drink alcohol.    ACTIVITY:  Today: no heavy lifting, straining or running due to procedural   sedation/anesthesia.  The following day: return to full activity including work.  DIET:  Eat and drink normally unless instructed otherwise.     TREATMENT FOR COMMON SIDE EFFECTS:  - Mild abdominal pain, nausea, belching, bloating or excessive gas:  rest,   eat lightly and use a heating pad.  - Sore Throat: treat with throat lozenges and/or gargle with warm salt   water.  - Because air was used during the procedure, expelling large amounts of air   from your rectum or belching is normal.  - If a bowel prep was taken, you may not have a bowel movement for 1-3 days.    This is normal.  SYMPTOMS TO WATCH FOR AND REPORT TO YOUR PHYSICIAN:  1. Abdominal pain or bloating, other than gas cramps.  2. Chest pain.  3. Back pain.  4. Signs of infection such as: chills or fever occurring within 24 hours   after the procedure.  5. Rectal bleeding, which would show as bright red, maroon, or black stools.   (A tablespoon of blood from the rectum is not serious,  especially if   hemorrhoids are present.)  6. Vomiting.  7. Weakness or dizziness.  GO DIRECTLY TO THE NEAREST EMERGENCY ROOM IF YOU HAVE ANY OF THE FOLLOWING:      Difficulty breathing              Chills and/or fever over 101 F   Persistent vomiting and/or vomiting blood   Severe abdominal pain   Severe chest pain   Black, tarry stools   Bleeding- more than one tablespoon   Any other symptom or condition that you feel may need urgent attention  Your doctor recommends these additional instructions:  If any biopsies were taken, your doctors clinic will contact you in 1 to 2   weeks with any results.  - Discharge patient to home (ambulatory).   - Resume previous diet today.   - Repeat colonoscopy not needed.  - Continue present medications.   - Return to normal activities tomorrow.  For questions, problems or results please call your physician - Hugh Yarbrough MD at Work:  [phone].  [facility] , EMERGENCY ROOM PHONE NUMBER: [phone]  IF A COMPLICATION OR EMERGENCY SITUATION ARISES AND YOU ARE UNABLE TO REACH   YOUR PHYSICIAN - GO DIRECTLY TO THE EMERGENCY ROOM.  MD Hugh Allison MD  11/7/2024 1:53:05 PM  This report has been verified and signed electronically.  Dear patient,  As a result of recent federal legislation (The Federal Cures Act), you may   receive lab or pathology results from your procedure in your [facility]   account before your physician is able to contact you. Your physician or   their representative will relay the results to you with their   recommendations at their soonest availability.  Thank you,  PROVATION

## 2024-11-07 NOTE — ANESTHESIA POSTPROCEDURE EVALUATION
Anesthesia Post Evaluation    Patient: Tanja Cohen    Procedure(s) Performed: Procedure(s) (LRB):  COLONOSCOPY (N/A)    Final Anesthesia Type: general      Patient location during evaluation: GI PACU  Patient participation: No - Unable to Participate, Sedation  Level of consciousness: sedated and responds to stimulation  Post-procedure vital signs: reviewed and stable  Pain management: adequate  Airway patency: patent    PONV status at discharge: No PONV  Anesthetic complications: no      Cardiovascular status: stable  Respiratory status: spontaneous ventilation and nasal cannula  Hydration status: euvolemic  Follow-up not needed.              Vitals Value Taken Time   BP 89/57 11/07/24 1332   Temp 36.6 °C (97.8 °F) 11/07/24 1051   Pulse 61 11/07/24 1332   Resp 18 11/07/24 1051   SpO2 100 % 11/07/24 1332         No case tracking events are documented in the log.      Pain/Mello Score: No data recorded

## 2024-11-07 NOTE — TRANSFER OF CARE
"Anesthesia Transfer of Care Note    Patient: Tanja Cohen    Procedure(s) Performed: Procedure(s) (LRB):  COLONOSCOPY (N/A)    Patient location: GI    Anesthesia Type: general    Post pain: adequate analgesia    Post assessment: no apparent anesthetic complications and tolerated procedure well    Post vital signs: stable    Level of consciousness: sedated and responds to stimulation    Nausea/Vomiting: no nausea/vomiting    Complications: none    Transfer of care protocol was followed      Last vitals: Visit Vitals  BP (!) 89/57   Pulse 61   Temp 36.6 °C (97.8 °F)   Resp 18   Ht 5' 2" (1.575 m)   Wt 46.7 kg (103 lb)   SpO2 100%   BMI 18.84 kg/m²     "

## 2024-11-07 NOTE — DISCHARGE SUMMARY
[facility] - Endoscopy  Discharge Note  Short Stay    Procedure(s) (LRB):  COLONOSCOPY (N/A)    The procedure of colonoscopy was explained to the patient and consent obtained.  The patient was transferred to the endoscopy suite, general IV anesthesia was provided by anesthesia Services.  Rectal exam was performed and normal.  The Olympus videocolonoscope was introduced per rectum and advanced around the colon to the cecum.  The ileocecal valve and appendiceal orifice were identified and normal.  Careful examination of the ascending, transverse and descending colon revealed no abnormalities.  There were few scattered varying size diverticula noted in the sigmoid colon with no evidence of diverticulitis.  The rectum was normal.  The endoscope was withdrawn.  Withdrawal time cecum to rectum 21 minutes.  The procedure was well tolerated and the patient returned to the recovery area for observation.      Discharge plan-the patient will resume her regular diet today and normal activities tomorrow.  A follow-up colonoscopy is not warranted due to the patient was age in normal colonoscopy.  OUTCOME: Patient tolerated treatment/procedure well without complication and is now ready for discharge.    DISPOSITION: Home or Self Care    FINAL DIAGNOSIS:  <principal problem not specified>    FOLLOWUP: With primary care provider    DISCHARGE INSTRUCTIONS:    Discharge Procedure Orders   Diet general     Call MD for:  temperature >100.4     Call MD for:  persistent nausea and vomiting     Call MD for:  severe uncontrolled pain     Call MD for:  difficulty breathing, headache or visual disturbances     Activity as tolerated         Clinical Reference Documents Added to Patient Instructions         Document    COLONOSCOPY DISCHARGE INSTRUCTIONS (ENGLISH)            TIME SPENT ON DISCHARGE: 5 minutes

## 2024-11-07 NOTE — H&P
Colonoscopy History and Physical    Patient Name: Tanja Cohen  MRN: [id number]  : 1945  Date of Procedure:  2024  Referring Physician: Chan Olivera DO  Primary Physician: Chan Olivera DO  Procedure Physician: Hugh Yarbrough MD    Procedure - Colonoscopy  ASA - per anesthesia  Mallampati - per anesthesia  History of Anesthesia problems - no  Family history Anesthesia problems -  no   Plan of anesthesia - General    Diagnosis: screening for colon cancer  Chief Complaint: Same as above    HPI: Patient is an 78 y.o. female is here for the above.  Patient notes that 27 years ago she had a colonoscopy which revealed a colon polyp.  Five years later she had a follow-up colonoscopy which was normal and that was the last exam she has had.  She has a history of chronic constipation for which she takes OTC medications with variable results.  She has had no overt rectal bleeding.  She has had no change in appetite or weight loss.    Last colonoscopy:  20+ years ago   Family history:  Negative  Anticoagulation: none  No  ROS:  Constitutional: No fevers, chills, No weight loss  CV: No chest pain  Pulm: No cough, No shortness of breath  GI: see HPI    Medical History:   Past Medical History:   Diagnosis Date    GERD (gastroesophageal reflux disease)     Hypertension          Surgical History:   Past Surgical History:   Procedure Laterality Date    SURGICAL REMOVAL OF BUNION WITH OSTEOTOMY OF METATARSAL BONE         Family History:   Family History   Problem Relation Name Age of Onset    Alcohol abuse Mother      Kidney disease Father      No Known Problems Sister      No Known Problems Brother     .    Social History:   Social History     Socioeconomic History    Marital status:    Tobacco Use    Smoking status: Never     Passive exposure: Never    Smokeless tobacco: Never   Substance and Sexual Activity    Alcohol use: Not Currently    Drug use: Never    Sexual activity: Yes     Birth  control/protection: Post-menopausal     Social Drivers of Health     Financial Resource Strain: Low Risk  (12/27/2022)    Overall Financial Resource Strain (CARDIA)     Difficulty of Paying Living Expenses: Not hard at all   Food Insecurity: No Food Insecurity (12/27/2022)    Hunger Vital Sign     Worried About Running Out of Food in the Last Year: Never true     Ran Out of Food in the Last Year: Never true   Transportation Needs: No Transportation Needs (12/27/2022)    PRAPARE - Transportation     Lack of Transportation (Medical): No     Lack of Transportation (Non-Medical): No   Physical Activity: Insufficiently Active (12/27/2022)    Exercise Vital Sign     Days of Exercise per Week: 2 days     Minutes of Exercise per Session: 60 min   Stress: No Stress Concern Present (12/27/2022)    [employer] [place] [employer] - Occupational Stress Questionnaire     Feeling of Stress : Not at all   Housing Stability: Low Risk  (12/27/2022)    Housing Stability Vital Sign     Unable to Pay for Housing in the Last Year: No     Number of Places Lived in the Last Year: 1     Unstable Housing in the Last Year: No       Review of patient's allergies indicates:   Allergen Reactions    Cephalexin Hives       Medications:   Medications Prior to Admission   Medication Sig Dispense Refill Last Dose/Taking    aspirin 81 MG Chew Take 1 tablet (81 mg total) by mouth once daily. 90 tablet 3 11/7/2024    lisinopriL-hydrochlorothiazide (PRINZIDE,ZESTORETIC) 10-12.5 mg per tablet Take 1 tablet by mouth once daily. 90 tablet 3 11/7/2024    omeprazole (PRILOSEC) 20 MG capsule Take 1 capsule (20 mg total) by mouth once daily. 90 capsule 3 11/7/2024    PREMARIN vaginal cream APPLY DIME SIZED AMOUNT TO VAGINAL WALL WITH GLOVED FINGER TWO TIMES A WEEK. (DO NOT USE WITH INTERCOURSE) 30 g 12 11/6/2024    azithromycin (Z-ROSETTE) 250 MG tablet Take 2 tablets by mouth on day 1; Take 1 tablet by mouth on days 2-5 (Patient not taking: Reported on  "12/6/2023) 6 tablet 0          Physical Exam:    Vital Signs:   Vitals:    11/07/24 1051   BP: (!) 179/90   Pulse: 74   Resp: 18   Temp: 97.8 °F (36.6 °C)     BP (!) 179/90   Pulse 74   Temp 97.8 °F (36.6 °C)   Resp 18   Ht 5' 2" (1.575 m)   Wt 46.7 kg (103 lb)   SpO2 100%   BMI 18.84 kg/m²     General:          Well appearing in no acute distress  Lungs: Clear to auscultation bilaterally, respirations unlabored  Heart: Regular rate and rhythm, S1 and S2 normal, no obvious murmurs  Abdomen:         Soft, non-tender, bowel sounds normal, no masses, no organomegaly        Labs:  Lab Results   Component Value Date    WBC 7.56 06/05/2023    HGB 13.8 06/05/2023    HCT 42.3 06/05/2023    MCV 87.8 06/05/2023     06/05/2023     No results found for: "INR", "PT", "APTT"  Lab Results   Component Value Date     06/10/2024    K 3.9 06/10/2024    CO2 26 06/10/2024    BUN 10.2 06/10/2024    CREATININE 0.74 06/10/2024    LABPROT 7.3 06/10/2024    ALBUMIN 3.6 06/10/2024    BILITOT 0.4 06/10/2024    ALKPHOS 60 06/10/2024    ALT 9 06/10/2024    AST 17 06/10/2024         Assessment and Plan:    History reviewed, vital signs satisfactory, cardiopulmonary status satisfactory.  I have explained the sedation options, risks, benefits, and alternatives of this endoscopic procedure to the patient including but not limited to bleeding, inflammation, infection, perforation, and death.  All questions were answered and the patient consented to proceed with procedure as planned.   The patient is deemed an appropriate candidate for the sedation as planned.      Hugh Yarbrough MD   of Clinical Medicine  Gastroenterology and Hepatology  [facility]    11/7/2024  11:19 AM     "

## 2024-11-07 NOTE — PLAN OF CARE
Pt to room from procedure. Awake alert oriented. Able to tolerate liquids. Able to dress self. Spoke with MD. Discharge instructions discussed. Verbalized understanding. Ready for discharge.

## 2024-11-08 VITALS
TEMPERATURE: 98 F | HEART RATE: 56 BPM | RESPIRATION RATE: 18 BRPM | OXYGEN SATURATION: 100 % | BODY MASS INDEX: 18.95 KG/M2 | HEIGHT: 62 IN | WEIGHT: 103 LBS | DIASTOLIC BLOOD PRESSURE: 76 MMHG | SYSTOLIC BLOOD PRESSURE: 157 MMHG

## 2024-12-23 ENCOUNTER — OFFICE VISIT (OUTPATIENT)
Dept: INTERNAL MEDICINE | Facility: CLINIC | Age: 79
End: 2024-12-23
Payer: MEDICARE

## 2024-12-23 VITALS
HEIGHT: 62 IN | TEMPERATURE: 98 F | DIASTOLIC BLOOD PRESSURE: 67 MMHG | HEART RATE: 77 BPM | RESPIRATION RATE: 18 BRPM | OXYGEN SATURATION: 100 % | WEIGHT: 102.81 LBS | SYSTOLIC BLOOD PRESSURE: 131 MMHG | BODY MASS INDEX: 18.92 KG/M2

## 2024-12-23 DIAGNOSIS — K21.9 GASTROESOPHAGEAL REFLUX DISEASE, UNSPECIFIED WHETHER ESOPHAGITIS PRESENT: ICD-10-CM

## 2024-12-23 DIAGNOSIS — M85.80 OSTEOPENIA, UNSPECIFIED LOCATION: ICD-10-CM

## 2024-12-23 DIAGNOSIS — I10 HYPERTENSION, UNSPECIFIED TYPE: Primary | ICD-10-CM

## 2024-12-23 DIAGNOSIS — E78.5 DYSLIPIDEMIA: ICD-10-CM

## 2024-12-23 PROCEDURE — 99213 OFFICE O/P EST LOW 20 MIN: CPT | Mod: PBBFAC

## 2024-12-23 RX ORDER — OMEPRAZOLE 20 MG/1
20 CAPSULE, DELAYED RELEASE ORAL DAILY
Qty: 90 CAPSULE | Refills: 3 | Status: SHIPPED | OUTPATIENT
Start: 2024-12-23

## 2024-12-23 NOTE — PROGRESS NOTES
Attending physician addendum-  Patient discussed in clinic with the resident.   Care provided is appropriate.   The 10-year ASCVD risk score (Kaushal MELENDEZ, et al., 2019) is: 29.8%    Values used to calculate the score:      Age: 79 years      Sex: Female      Is Non- : No      Diabetic: No      Tobacco smoker: No      Systolic Blood Pressure: 131 mmHg      Is BP treated: Yes      HDL Cholesterol: 63 mg/dL      Total Cholesterol: 238 mg/dL

## 2024-12-23 NOTE — PROGRESS NOTES
[facility] Internal Medicine Resident Clinic    Subjective:      Tanja Cohen is a 79 y.o. female who  has a past medical history of GERD (gastroesophageal reflux disease) and Hypertension.  She presents to clinic today for follow-up of chronic medical conditions.  Patient states she is doing well currently has no complaints. She works out every morning, and takes care of her SO with multiple medical issues at 430 AM. She measures her BP in the gym every morning and it tends to be 90s/60s. Denies chest pain, SOB, weakness, fatigue, unintentional weight loss, lightheadedness, dizziness, abd pain, n/v, dysuria or peripheral edema.  .  Review of Systems:  10 point ROS negative except for HPI    Objective:   Vital Signs:  Vitals:    12/23/24 0745   BP: 131/67   Pulse: 77   Resp: 18   Temp: 97.9 °F (36.6 °C)     Body mass index is 18.8 kg/m².     General:  Well developed, well nourished, no acute respiratory distress  Head: Normocephalic, atraumatic  Eyes: PERRL, EOMI, anicteric sclera  Throat: No posterior pharyngeal erythema or exudate, no tonsillar exudate  Neck: supple, normal ROM, no thyromegaly   CVS:  RRR, S1 and S2 normal, 3/6 systolic murmurs, no added heart sounds, rubs, gallops, 2+ peripheral pulses  Resp:  Lungs clear to auscultation bilaterally, no wheezes, rales, or rhonci  GI:  Abdomen soft, non-tender, non-distended, normoactive bowel sounds  MSK:  No muscle atrophy, cyanosis, peripheral edema, full range of motion  Skin:  No rashes, ulcers, erythema  Neuro:  Alert and oriented x3, No focal neuro deficits, CNII-XII grossly intact  Psych:  Appropriate mood and affect     Laboratory:  Lab Results   Component Value Date    WBC 7.56 06/05/2023    HGB 13.8 06/05/2023    HCT 42.3 06/05/2023     06/05/2023    MCV 87.8 06/05/2023    RDW 12.7 06/05/2023    Lab Results   Component Value Date     06/10/2024    K 3.9 06/10/2024     06/10/2024    CO2 26 06/10/2024    BUN 10.2 06/10/2024    CREATININE  0.74 06/10/2024    CALCIUM 9.1 06/10/2024      Lab Results   Component Value Date    HGBA1C 5.7 06/10/2024    .9 06/10/2024    CREATININE 0.74 06/10/2024    Lab Results   Component Value Date    TSH 1.8123 02/23/2021                .labDM:   Lab Results   Component Value Date    HGBA1C 5.7 06/10/2024    .9 06/10/2024    CREATININE 0.74 06/10/2024     Thyroid:   Lab Results   Component Value Date    TSH 1.8123 02/23/2021     Anemia:   Lab Results   Component Value Date    [id number] 516 02/23/2021       Current Medications:  Current Outpatient Medications   Medication Instructions    aspirin 81 mg, Oral, Daily    azithromycin (Z-ROSETTE) 250 MG tablet Take 2 tablets by mouth on day 1; Take 1 tablet by mouth on days 2-5      lisinopriL-hydrochlorothiazide (PRINZIDE,ZESTORETIC) 10-12.5 mg per tablet 1 tablet, Oral, Daily    omeprazole (PRILOSEC) 20 mg, Oral, Daily    PREMARIN vaginal cream APPLY DIME SIZED AMOUNT TO VAGINAL WALL WITH GLOVED FINGER TWO TIMES A WEEK. (DO NOT USE WITH INTERCOURSE)        Assessment and Plan:      Hypertension  -131/67 today   -patient reports checking every morning at the gym, appears well controlled per pictures on patients phone.   -continue HCTZ-Lisinopril     Vitamin D deficiency  -Normal level as of 6/2024  -continue to monitor      Dyslipidemia  -Patient refuses to be placed on any statins  -continue active lifestyle     GERD  -currently on PPI therapy, refilled today   -symptoms controlled  -continue to monitor     Vaginal Atrophy  -f/u in GYN clinic, last seen 6/26/2024  -continue topical estrogen cream     Osteopenia  -diagnosed on bone density in 2012  -continue weight bearing exercise  -continue vitamin d supplementation as needed      Preventative  DM (age>45 or HTN): A1c 5.4 6/2023  Lipid (age>35): cholesterol elevated, declined statin  Osteoporosis: (age>65 or FRAX > 9.3%): declined screenign despite extensive risk/benefit discussion (reportedly she states it was  previously normal)  Syphillis (sexually active): declined  HCV (once if born btwn 2455-5459): negative 6/2024  HIV (once age 15-65): negative 6/2024  Lung Ca (30PY smoker age 55-79 or quit in last 15y): n/a  Colon Ca: (age 50-75-annual FOBT, 5y flex + FOBTq3 or 10y c-scope): last done November 2024, recommended discontinuing follow-up secondary to age.   Mammogram (annual >40 or <10 yr age difference since 1st degree relative diagnosed): last done 6/2022, repeat ordered via gyn   Immunizations (generally - flu, shingrix, t-dap, PCV, Covid): uptodate    RTC in 6 months   Labs at next visit   Omeprazole refilled today     Adelaide Sidhu MD  [facility] Internal Medicine, PGY-3

## 2025-05-23 DIAGNOSIS — I10 HYPERTENSION, UNSPECIFIED TYPE: ICD-10-CM

## 2025-05-24 ENCOUNTER — OFFICE VISIT (OUTPATIENT)
Dept: URGENT CARE | Facility: CLINIC | Age: 80
End: 2025-05-24
Payer: MEDICARE

## 2025-05-24 VITALS
RESPIRATION RATE: 18 BRPM | WEIGHT: 96 LBS | HEART RATE: 81 BPM | OXYGEN SATURATION: 99 % | DIASTOLIC BLOOD PRESSURE: 65 MMHG | HEIGHT: 62 IN | SYSTOLIC BLOOD PRESSURE: 152 MMHG | BODY MASS INDEX: 17.66 KG/M2 | TEMPERATURE: 98 F

## 2025-05-24 DIAGNOSIS — L23.7 CONTACT DERMATITIS DUE TO POISON IVY: Primary | ICD-10-CM

## 2025-05-24 DIAGNOSIS — I10 PRIMARY HYPERTENSION: ICD-10-CM

## 2025-05-24 PROCEDURE — 99214 OFFICE O/P EST MOD 30 MIN: CPT | Mod: 25,,, | Performed by: FAMILY MEDICINE

## 2025-05-24 PROCEDURE — 96372 THER/PROPH/DIAG INJ SC/IM: CPT | Mod: ,,,

## 2025-05-24 RX ORDER — TRIAMCINOLONE ACETONIDE 1 MG/G
CREAM TOPICAL 2 TIMES DAILY
Qty: 15 G | Refills: 0 | Status: SHIPPED | OUTPATIENT
Start: 2025-05-24 | End: 2025-05-31

## 2025-05-24 RX ORDER — BETAMETHASONE SODIUM PHOSPHATE AND BETAMETHASONE ACETATE 3; 3 MG/ML; MG/ML
6 INJECTION, SUSPENSION INTRA-ARTICULAR; INTRALESIONAL; INTRAMUSCULAR; SOFT TISSUE
Status: COMPLETED | OUTPATIENT
Start: 2025-05-24 | End: 2025-05-24

## 2025-05-24 RX ORDER — LISINOPRIL AND HYDROCHLOROTHIAZIDE 10; 12.5 MG/1; MG/1
1 TABLET ORAL DAILY
Qty: 30 TABLET | Refills: 0 | Status: SHIPPED | OUTPATIENT
Start: 2025-05-24 | End: 2025-05-26 | Stop reason: SDUPTHER

## 2025-05-24 RX ORDER — PREDNISONE 20 MG/1
20 TABLET ORAL DAILY
Qty: 3 TABLET | Refills: 0 | Status: SHIPPED | OUTPATIENT
Start: 2025-05-25 | End: 2025-05-28

## 2025-05-24 RX ADMIN — BETAMETHASONE SODIUM PHOSPHATE AND BETAMETHASONE ACETATE 6 MG: 3; 3 INJECTION, SUSPENSION INTRA-ARTICULAR; INTRALESIONAL; INTRAMUSCULAR; SOFT TISSUE at 08:05

## 2025-05-24 NOTE — PATIENT INSTRUCTIONS
Discussed the physical finding, condition and course.  Claritin or Allegra for itching.  Luke warm water shower.  Avoid extremes of temperature.  Wash clothes in contact every day.  Celestone IM today as anti inflammation for symptom relief, risk and benefits discussed voiced understanding  Topical medication as needed and as directed not more than 1 week  Prednisone starting tomorrow for persistent and worsening symptoms.    Blood pressure medication refill, encouraged to keep appointment with primary MD.  For further medication refill and management patient will follow up with primary MD  Call or return to clinic for any questions.  ER precautions

## 2025-05-24 NOTE — PROGRESS NOTES
"Subjective:      Patient ID: Tanja Cohen is a 79 y.o. female.    Vitals:  height is 5' 2" (1.575 m) and weight is 43.5 kg (96 lb). Her oral temperature is 97.5 °F (36.4 °C). Her blood pressure is 152/65 (abnormal) and her pulse is 81. Her respiration is 18 and oxygen saturation is 99%.     Chief Complaint: Rash (Possible shingles/Pt requested refill of blood pressure medication)     Patient is a 79 y.o. female who presents to urgent care with complaints of possible shingles/rash lower left abdomen x1 days. Alleviating factors include alcohol with mild amount of relief. Patient denies fever and body aches.      ROS :  Constitutional : No fever, no weakness  Eyes : No redness, no drainage  HEENT : No sore throat, no difficulty swallowing  Neck : Negative except HPI  Respiratory : No cough, no shortness of breath or wheezing  Cardiovascular : No chest pain  Integumentary : Negative except HPI  Neurological : No tingling, no weakness     [place]:  79-year-old female present to clinic with concerns of itchy rash left forearm palmar aspect just above the wrist since 2 weeks.  Worsening.  Felt similar rash right arm and in last 2 days left mid to lower abdominal wall.  Associated with intense itching.  No pain.  No bites.  Requesting for cortisone injection as it helped in the past.  Over-the-counter medication as listed above in the chief complaint some help.  History of hypertension, requesting for blood pressure medication refill, states has appointment with primary MD next month.  By then she will be running out of medications.  No chest pain, no shortness a breath, no concerns of dyspnea on exertion or PND    Objective:     Physical Exam  General : Alert and oriented, No apparent distress, Afebrile, comfortable sitting in the exam chair, clear speech  Neck -: supple, Non Tender  Respiratory :Lungs are clear to auscultate, Breath sounds are equal  Cardiovascular : Normal rate, normal volume pulse " bilateral  Integumentary : Warm, Dry and erythematous scaly rash light forearm palmar aspect above the wrist consistent with contact dermatitis.  Left mid and lower abdominal wall faint erythematous rash, nontender to palpate, no vesicular lesions.  Does not follow the dermatome.  Clinically no concerns of shingles.  Neurologic : Alert and Oriented X 4, sensations intact, motor intact   Assessment:     1. Contact dermatitis due to poison ivy    2. Primary hypertension      Plan:   Discussed the physical finding, condition and course.  Claritin or Allegra for itching.  Luke warm water shower.  Avoid extremes of temperature.  Wash clothes in contact every day.  Celestone IM today as anti inflammation for symptom relief, risk and benefits discussed voiced understanding  Topical medication as needed and as directed not more than 1 week  Prednisone starting tomorrow for persistent and worsening symptoms.    Blood pressure medication refill, encouraged to keep appointment with primary MD.  For further medication refill and management patient will follow up with primary MD  Call or return to clinic for any questions.  ER precautions    Contact dermatitis due to poison ivy  -     betamethasone acetate-betamethasone sodium phosphate injection 6 mg  -     triamcinolone acetonide 0.1% (KENALOG) 0.1 % cream; Apply topically 2 (two) times daily. for 7 days  Dispense: 15 g; Refill: 0  -     predniSONE (DELTASONE) 20 MG tablet; Take 1 tablet (20 mg total) by mouth once daily. for 3 days  Dispense: 3 tablet; Refill: 0    Primary hypertension  -     lisinopriL-hydrochlorothiazide (PRINZIDE,ZESTORETIC) 10-12.5 mg per tablet; Take 1 tablet by mouth once daily.  Dispense: 30 tablet; Refill: 0

## 2025-05-26 RX ORDER — LISINOPRIL AND HYDROCHLOROTHIAZIDE 10; 12.5 MG/1; MG/1
1 TABLET ORAL DAILY
Qty: 90 TABLET | Refills: 3 | Status: SHIPPED | OUTPATIENT
Start: 2025-05-26 | End: 2026-05-26

## 2025-06-05 ENCOUNTER — TELEPHONE (OUTPATIENT)
Dept: URGENT CARE | Facility: CLINIC | Age: 80
End: 2025-06-05
Payer: MEDICARE

## 2025-06-05 DIAGNOSIS — L23.7 CONTACT DERMATITIS DUE TO POISON IVY: ICD-10-CM

## 2025-06-05 DIAGNOSIS — L30.9 DERMATITIS: Primary | ICD-10-CM

## 2025-06-05 RX ORDER — TRIAMCINOLONE ACETONIDE 1 MG/G
CREAM TOPICAL 2 TIMES DAILY
Qty: 15 G | Refills: 0 | Status: SHIPPED | OUTPATIENT
Start: 2025-06-05 | End: 2025-06-12

## 2025-06-09 ENCOUNTER — LAB VISIT (OUTPATIENT)
Dept: LAB | Facility: HOSPITAL | Age: 80
End: 2025-06-09
Payer: MEDICARE

## 2025-06-09 ENCOUNTER — OFFICE VISIT (OUTPATIENT)
Dept: INTERNAL MEDICINE | Facility: CLINIC | Age: 80
End: 2025-06-09
Payer: MEDICARE

## 2025-06-09 VITALS
HEIGHT: 62 IN | RESPIRATION RATE: 18 BRPM | OXYGEN SATURATION: 100 % | SYSTOLIC BLOOD PRESSURE: 136 MMHG | TEMPERATURE: 98 F | BODY MASS INDEX: 18.89 KG/M2 | HEART RATE: 82 BPM | WEIGHT: 102.63 LBS | DIASTOLIC BLOOD PRESSURE: 60 MMHG

## 2025-06-09 DIAGNOSIS — K21.9 GASTROESOPHAGEAL REFLUX DISEASE, UNSPECIFIED WHETHER ESOPHAGITIS PRESENT: ICD-10-CM

## 2025-06-09 DIAGNOSIS — K59.00 CONSTIPATION, UNSPECIFIED CONSTIPATION TYPE: ICD-10-CM

## 2025-06-09 DIAGNOSIS — E78.5 DYSLIPIDEMIA: ICD-10-CM

## 2025-06-09 DIAGNOSIS — I10 HYPERTENSION, UNSPECIFIED TYPE: Primary | ICD-10-CM

## 2025-06-09 DIAGNOSIS — I10 HYPERTENSION, UNSPECIFIED TYPE: ICD-10-CM

## 2025-06-09 LAB
ALBUMIN SERPL-MCNC: 4.1 G/DL (ref 3.4–4.8)
ALBUMIN/GLOB SERPL: 0.9 RATIO (ref 1.1–2)
ALP SERPL-CCNC: 81 UNIT/L (ref 40–150)
ALT SERPL-CCNC: 8 UNIT/L (ref 0–55)
ANION GAP SERPL CALC-SCNC: 12 MEQ/L
ANISOCYTOSIS BLD QL SMEAR: ABNORMAL
AST SERPL-CCNC: 11 UNIT/L (ref 11–45)
BASOPHILS # BLD AUTO: 0.13 X10(3)/MCL
BASOPHILS NFR BLD AUTO: 1.4 %
BILIRUB SERPL-MCNC: 0.4 MG/DL
BUN SERPL-MCNC: 14.8 MG/DL (ref 9.8–20.1)
CALCIUM SERPL-MCNC: 9.5 MG/DL (ref 8.4–10.2)
CHLORIDE SERPL-SCNC: 107 MMOL/L (ref 98–107)
CHOLEST SERPL-MCNC: 218 MG/DL
CHOLEST/HDLC SERPL: 3 {RATIO} (ref 0–5)
CO2 SERPL-SCNC: 23 MMOL/L (ref 23–31)
CREAT SERPL-MCNC: 0.73 MG/DL (ref 0.55–1.02)
CREAT/UREA NIT SERPL: 20
ELLIPTOCYTOSIS (OHS): ABNORMAL
EOSINOPHIL # BLD AUTO: 0.21 X10(3)/MCL (ref 0–0.9)
EOSINOPHIL NFR BLD AUTO: 2.2 %
ERYTHROCYTE [DISTWIDTH] IN BLOOD BY AUTOMATED COUNT: 18.7 % (ref 11.5–17)
GFR SERPLBLD CREATININE-BSD FMLA CKD-EPI: >60 ML/MIN/1.73/M2
GLOBULIN SER-MCNC: 4.6 GM/DL (ref 2.4–3.5)
GLUCOSE SERPL-MCNC: 102 MG/DL (ref 82–115)
HCT VFR BLD AUTO: 28.6 % (ref 37–47)
HDLC SERPL-MCNC: 64 MG/DL (ref 35–60)
HGB BLD-MCNC: 7.5 G/DL (ref 12–16)
IMM GRANULOCYTES # BLD AUTO: 0.02 X10(3)/MCL (ref 0–0.04)
IMM GRANULOCYTES NFR BLD AUTO: 0.2 %
LDLC SERPL CALC-MCNC: 115 MG/DL (ref 50–140)
LYMPHOCYTES # BLD AUTO: 0.98 X10(3)/MCL (ref 0.6–4.6)
LYMPHOCYTES NFR BLD AUTO: 10.4 %
MCH RBC QN AUTO: 16.4 PG (ref 27–31)
MCHC RBC AUTO-ENTMCNC: 26.2 G/DL (ref 33–36)
MCV RBC AUTO: 62.6 FL (ref 80–94)
MONOCYTES # BLD AUTO: 1.02 X10(3)/MCL (ref 0.1–1.3)
MONOCYTES NFR BLD AUTO: 10.8 %
NEUTROPHILS # BLD AUTO: 7.08 X10(3)/MCL (ref 2.1–9.2)
NEUTROPHILS NFR BLD AUTO: 75 %
NRBC BLD AUTO-RTO: 0 %
PLATELET # BLD AUTO: 456 X10(3)/MCL (ref 130–400)
PLATELET # BLD EST: ABNORMAL 10*3/UL
PMV BLD AUTO: 10.3 FL (ref 7.4–10.4)
POIKILOCYTOSIS BLD QL SMEAR: SLIGHT
POTASSIUM SERPL-SCNC: 3.3 MMOL/L (ref 3.5–5.1)
PROT SERPL-MCNC: 8.7 GM/DL (ref 5.8–7.6)
RBC # BLD AUTO: 4.57 X10(6)/MCL (ref 4.2–5.4)
SODIUM SERPL-SCNC: 142 MMOL/L (ref 136–145)
SPHEROCYTES BLD QL SMEAR: ABNORMAL
TRIGL SERPL-MCNC: 197 MG/DL (ref 37–140)
VLDLC SERPL CALC-MCNC: 39 MG/DL
WBC # BLD AUTO: 9.44 X10(3)/MCL (ref 4.5–11.5)

## 2025-06-09 PROCEDURE — 80053 COMPREHEN METABOLIC PANEL: CPT

## 2025-06-09 PROCEDURE — 85025 COMPLETE CBC W/AUTO DIFF WBC: CPT

## 2025-06-09 PROCEDURE — 99214 OFFICE O/P EST MOD 30 MIN: CPT | Mod: PBBFAC

## 2025-06-09 PROCEDURE — 36415 COLL VENOUS BLD VENIPUNCTURE: CPT

## 2025-06-09 PROCEDURE — 80061 LIPID PANEL: CPT

## 2025-06-09 RX ORDER — LISINOPRIL AND HYDROCHLOROTHIAZIDE 10; 12.5 MG/1; MG/1
1 TABLET ORAL DAILY
Qty: 90 TABLET | Refills: 3 | Status: SHIPPED | OUTPATIENT
Start: 2025-06-09 | End: 2026-06-09

## 2025-06-09 RX ORDER — AMOXICILLIN 250 MG
1 CAPSULE ORAL DAILY
Qty: 90 TABLET | Refills: 1 | Status: SHIPPED | OUTPATIENT
Start: 2025-06-09

## 2025-06-09 RX ORDER — OMEPRAZOLE 20 MG/1
20 CAPSULE, DELAYED RELEASE ORAL DAILY
Qty: 90 CAPSULE | Refills: 3 | Status: SHIPPED | OUTPATIENT
Start: 2025-06-09

## 2025-06-09 NOTE — PROGRESS NOTES
[facility] Internal Medicine Resident Clinic    Subjective:      Tanja Cohen is a 79 y.o. female who  has a past medical history of GERD (gastroesophageal reflux disease) and Hypertension.  She presents to clinic today for follow-up of chronic medical conditions.  Patient states she is doing well currently has no complaints. She works out every morning. She recently had gone to Urgent care for contact dermatitis but she states her rash is better.  She does endorse constipation that she had tried using Miralax for with no improvement. Denies chest pain, SOB, abd pain.     Review of Systems:  10 point ROS negative except for HPI    Objective:   Vital Signs:  Vitals:    06/09/25 0748   BP: 136/60   Pulse:    Resp:    Temp:      Body mass index is 18.77 kg/m².     General:  Well developed, well nourished, no acute respiratory distress  Head: Normocephalic, atraumatic  Eyes: PERRL, EOMI, anicteric sclera  Throat: No posterior pharyngeal erythema or exudate, no tonsillar exudate  Neck: supple, normal ROM, no thyromegaly   CVS:  RRR, S1 and S2 normal, 3/6 systolic murmurs, no added heart sounds, rubs, gallops, 2+ peripheral pulses  Resp:  Lungs clear to auscultation bilaterally, no wheezes, rales, or rhonci  GI:  Abdomen soft, non-tender, non-distended, normoactive bowel sounds  MSK:  No muscle atrophy, cyanosis, peripheral edema, full range of motion  Neuro:  Alert and oriented x3, No focal neuro deficits, CNII-XII grossly intact  Psych:  Appropriate mood and affect     Laboratory:  Lab Results   Component Value Date    WBC 7.56 06/05/2023    HGB 13.8 06/05/2023    HCT 42.3 06/05/2023     06/05/2023    MCV 87.8 06/05/2023    RDW 12.7 06/05/2023    Lab Results   Component Value Date     06/10/2024    K 3.9 06/10/2024     06/10/2024    CO2 26 06/10/2024    BUN 10.2 06/10/2024    CREATININE 0.74 06/10/2024    GLU 89 06/10/2024    CALCIUM 9.1 06/10/2024      Lab Results   Component Value Date    HGBA1C 5.7  06/10/2024    .9 06/10/2024    CREATININE 0.74 06/10/2024    Lab Results   Component Value Date    TSH 1.8123 02/23/2021                .labDM:   Lab Results   Component Value Date    HGBA1C 5.7 06/10/2024    .9 06/10/2024    CREATININE 0.74 06/10/2024     Thyroid:   Lab Results   Component Value Date    TSH 1.8123 02/23/2021     Anemia:   Lab Results   Component Value Date    [id number] 516 02/23/2021       Current Medications:  Current Outpatient Medications   Medication Instructions    aspirin 81 mg, Oral, Daily    lisinopriL-hydrochlorothiazide (PRINZIDE,ZESTORETIC) 10-12.5 mg per tablet 1 tablet, Oral, Daily    omeprazole (PRILOSEC) 20 mg, Oral, Daily    PREMARIN vaginal cream APPLY DIME SIZED AMOUNT TO VAGINAL WALL WITH GLOVED FINGER TWO TIMES A WEEK. (DO NOT USE WITH INTERCOURSE)    triamcinolone acetonide 0.1% (KENALOG) 0.1 % cream Topical (Top), 2 times daily        Assessment and Plan:      Hypertension  -136/60 today   -patient reports checking every morning at the gym, appears well controlled   -continue HCTZ-Lisinopril  - discontinued ASA as not indicated. Denies any cardiac history.      Vitamin D deficiency  -Normal level as of 6/2024  -continue to monitor      Dyslipidemia  -Patient refuses to be placed on any statins  -continue active lifestyle  - Will order repeat labs     GERD  -currently on PPI therapy, refilled today   -symptoms controlled  -continue to monitor     Vaginal Atrophy  -f/u in GYN clinic, last seen 6/26/2024  -continue topical estrogen cream     Osteopenia  -diagnosed on bone density in 2012  -continue weight bearing exercise  -continue vitamin d supplementation as needed   - declined DEXA     Preventative  DM (age>45 or HTN): A1c 5.4 6/2023  Lipid (age>35): cholesterol elevated, declined statin  Osteoporosis: (age>65 or FRAX > 9.3%): declined screenign despite extensive risk/benefit discussion (reportedly she states it was previously normal)  Syphillis (sexually  active): declined  HCV (once if born btwn 8837-9230): negative 6/2024  HIV (once age 15-65): negative 6/2024  Lung Ca (30PY smoker age 55-79 or quit in last 15y): n/a  Colon Ca: (age 50-75-annual FOBT, 5y flex + FOBTq3 or 10y c-scope): last done November 2024, recommended discontinuing follow-up secondary to age.   Mammogram (annual >40 or <10 yr age difference since 1st degree relative diagnosed): last done 6/2022, no longer indicated due to age  Immunizations (generally - flu, shingrix, t-dap, PCV, Covid): uptodate    RTC in 6 months        Chan Olivera DO  [facility] Internal Medicine, PGY-1

## 2025-06-09 NOTE — PROGRESS NOTES
I saw and evaluated the patient and was present for the key portions of the exam and separately billed procedures, if any. I have reveiwed and agree with the resident's findings, including all diagnostic interpretations and plans as written.    Will discontinue her ASA for primary prevention. Will d/c her mammograms. Lipid panel today. Remainder per resident note.     Elizabeth Rivas (Physician Assistant) PAST SURGICAL HISTORY:  S/P femoral-femoral bypass surgery 2018 - right leg

## 2025-06-10 ENCOUNTER — TELEPHONE (OUTPATIENT)
Dept: INTERNAL MEDICINE | Facility: CLINIC | Age: 80
End: 2025-06-10
Payer: MEDICARE

## 2025-06-10 NOTE — TELEPHONE ENCOUNTER
Reviewed patient's CBC and H/H was low. Hgb of 7.5. Tried to call patient to question more about symptoms such as melena or hemoptysis but she did not answer the phone. Tried to call the alternative number but still no response.     Chan Olivera, DO  Internal Medicine - PGY-1

## 2025-06-27 ENCOUNTER — TELEPHONE (OUTPATIENT)
Dept: INTERNAL MEDICINE | Facility: CLINIC | Age: 80
End: 2025-06-27
Payer: MEDICARE

## 2025-06-27 NOTE — TELEPHONE ENCOUNTER
Called patient again to follow up on her abnormal labs. Patient states that she has no hematochezia, melena, hematemesis, hemoptysis, dizziness or lightheadness. Patient does not wish to further pursue more blood work or testing to further work up her anemia. Told her potassium was low as well. She does not want to come into clinic until December. Discusses that I would highly recommend repeat labs and more workup but she states that she feels healthy and thinks she is okay. Discussed with her that she should go to the ED if she becomes symptomatic with any of the symptoms stated above.     Chan Olivera, DO  Internal Medicine - PGY-1

## 2025-06-30 ENCOUNTER — OFFICE VISIT (OUTPATIENT)
Dept: GYNECOLOGY | Facility: CLINIC | Age: 80
End: 2025-06-30
Payer: MEDICARE

## 2025-06-30 VITALS
DIASTOLIC BLOOD PRESSURE: 66 MMHG | WEIGHT: 105.63 LBS | OXYGEN SATURATION: 100 % | TEMPERATURE: 97 F | SYSTOLIC BLOOD PRESSURE: 149 MMHG | HEIGHT: 62 IN | RESPIRATION RATE: 20 BRPM | BODY MASS INDEX: 19.44 KG/M2

## 2025-06-30 DIAGNOSIS — Z01.419 ENCOUNTER FOR ANNUAL ROUTINE GYNECOLOGICAL EXAMINATION: Primary | ICD-10-CM

## 2025-06-30 DIAGNOSIS — N95.2 ATROPHIC VAGINITIS: ICD-10-CM

## 2025-06-30 PROCEDURE — 99213 OFFICE O/P EST LOW 20 MIN: CPT | Mod: PBBFAC | Performed by: NURSE PRACTITIONER

## 2025-06-30 PROCEDURE — G0101 CA SCREEN;PELVIC/BREAST EXAM: HCPCS | Mod: PBBFAC | Performed by: NURSE PRACTITIONER

## 2025-06-30 RX ORDER — CONJUGATED ESTROGENS 0.62 MG/G
CREAM VAGINAL
Qty: 30 G | Refills: 6 | Status: SHIPPED | OUTPATIENT
Start: 2025-06-30

## 2025-06-30 NOTE — PROGRESS NOTES
"  [facility] -  Gynecology / Women's Health Clinic      Subjective:       Patient ID: Tanja Cohen is a 79 y.o. female.    Chief Complaint:  Gynecologic Exam    History of Present Illness  Pt is  here for annual gyn exam. Denies hx of mod/severe dysplasia. Pt is postmenopausal. Last pap--NIL. Denies vaginal bleeding or discharge. Denies abd/pelvic pain. Currently using Premarin cream 2x/week for atrophic vaginitis. Denies urinary complaints. MG-22-BIRADS 1, she and PCP have decided to stop MGs. Denies tobacco use. Denies fly hx of breast, ovarian, uterine or colon cancer. Hx of osteopenia and declines DEXA. Pt states she rides bike and swims and weight lifting 3 days/week. States she gets her calcium through diet and vitamin D in sun. Colonoscopy in . Followed by IM for primary care.     GYN & OB History  No LMP recorded. Patient is postmenopausal.       Review of patient's allergies indicates:   Allergen Reactions    Cephalexin Hives     Past Medical History:   Diagnosis Date    GERD (gastroesophageal reflux disease)     Hypertension      OB History    Para Term  AB Living   2 0   2    SAB IAB Ectopic Multiple Live Births             # Outcome Date GA Lbr Jose R/2nd Weight Sex Type Anes PTL Lv   2 AB 1972           1 AB 1968                Review of Systems  Review of Systems    Negative except for pertinent findings for positives per HPI     Objective:    Physical Exam    BP (!) 149/66 (BP Location: Right arm, Patient Position: Sitting)   Temp 97.4 °F (36.3 °C) (Oral)   Resp 20   Ht 5' 2" (1.575 m)   Wt 47.9 kg (105 lb 9.6 oz)   SpO2 100%   BMI 19.31 kg/m²   GENERAL: Well-developed female. No acute distress.    SKIN: Normal to inspection, warm and intact.  BREASTS: No rashes or erythema. No masses, lumps, discharge, tenderness.  VULVA: General appearance normal; external genitalia with no lesions or erythema.  VAGINA: Mucosa/vaginal vault pale, no abnormal " discharge or lesions.  CERVIX: pale, nulliparous appearing os, no erythema or abnormal discharge.  BIMANUAL EXAM: reveals a 6 week-sized uterus. The uterus is non tender. Theo adnexa reveal no tenderness.  PSYCHIATRIC: Patient is oriented to person, place, and time. Mood and affect are normal.    Assessment:         ICD-10-CM ICD-9-CM   1. Encounter for annual routine gynecological examination  Z01.419 V72.31     Plan:   Tanja was seen today for gynecologic exam.    Diagnoses and all orders for this visit:    Encounter for annual routine gynecological examination    Pelvic today, pap deferred d/t age over 65 and adequate screening.  Continue Premarin cream 2x/week  Call clinic with any GYN concerns  Follow up in about 1 year (around 6/30/2026) for annual exam.

## 2025-08-15 ENCOUNTER — HOSPITAL ENCOUNTER (OUTPATIENT)
Facility: HOSPITAL | Age: 80
Discharge: HOME | End: 2025-08-17
Attending: INTERNAL MEDICINE | Admitting: INTERNAL MEDICINE
Payer: MEDICARE

## 2025-08-15 ENCOUNTER — OFFICE VISIT (OUTPATIENT)
Dept: URGENT CARE | Facility: CLINIC | Age: 80
End: 2025-08-15
Payer: MEDICARE

## 2025-08-15 VITALS
RESPIRATION RATE: 18 BRPM | WEIGHT: 95 LBS | OXYGEN SATURATION: 100 % | DIASTOLIC BLOOD PRESSURE: 62 MMHG | TEMPERATURE: 98 F | BODY MASS INDEX: 17.48 KG/M2 | HEART RATE: 94 BPM | SYSTOLIC BLOOD PRESSURE: 164 MMHG | HEIGHT: 62 IN

## 2025-08-15 DIAGNOSIS — R79.89 ELEVATED BRAIN NATRIURETIC PEPTIDE (BNP) LEVEL: ICD-10-CM

## 2025-08-15 DIAGNOSIS — R07.89 ATYPICAL CHEST PAIN: Primary | ICD-10-CM

## 2025-08-15 DIAGNOSIS — I10 PRIMARY HYPERTENSION: ICD-10-CM

## 2025-08-15 DIAGNOSIS — R07.89 ATYPICAL CHEST PAIN: ICD-10-CM

## 2025-08-15 DIAGNOSIS — R07.9 CHEST PAIN: ICD-10-CM

## 2025-08-15 DIAGNOSIS — D64.9 SYMPTOMATIC ANEMIA: Primary | ICD-10-CM

## 2025-08-15 LAB
ABO + RH BLD: NORMAL
ABO + RH BLD: NORMAL
ABORH RETYPE: NORMAL
ALBUMIN SERPL-MCNC: 3.8 G/DL (ref 3.4–4.8)
ALBUMIN/GLOB SERPL: 0.9 RATIO (ref 1.1–2)
ALP SERPL-CCNC: 92 UNIT/L (ref 40–150)
ALT SERPL-CCNC: 7 UNIT/L (ref 0–55)
ANION GAP SERPL CALC-SCNC: 10 MEQ/L
ANISOCYTOSIS BLD QL SMEAR: ABNORMAL
AST SERPL-CCNC: 10 UNIT/L (ref 11–45)
BASOPHILS # BLD AUTO: 0.1 X10(3)/MCL
BASOPHILS NFR BLD AUTO: 1.4 %
BILIRUB SERPL-MCNC: 0.5 MG/DL
BLD PROD TYP BPU: NORMAL
BLD PROD TYP BPU: NORMAL
BLOOD UNIT EXPIRATION DATE: NORMAL
BLOOD UNIT EXPIRATION DATE: NORMAL
BLOOD UNIT TYPE CODE: 5100
BLOOD UNIT TYPE CODE: 5100
BUN SERPL-MCNC: 6.6 MG/DL (ref 9.8–20.1)
CALCIUM SERPL-MCNC: 8.7 MG/DL (ref 8.4–10.2)
CHLORIDE SERPL-SCNC: 106 MMOL/L (ref 98–107)
CO2 SERPL-SCNC: 26 MMOL/L (ref 23–31)
CREAT SERPL-MCNC: 0.65 MG/DL (ref 0.55–1.02)
CREAT/UREA NIT SERPL: 10
CROSSMATCH INTERPRETATION: NORMAL
CROSSMATCH INTERPRETATION: NORMAL
DISPENSE STATUS: NORMAL
DISPENSE STATUS: NORMAL
EKG 12-LEAD: NORMAL
EOSINOPHIL # BLD AUTO: 0.26 X10(3)/MCL (ref 0–0.9)
EOSINOPHIL NFR BLD AUTO: 3.7 %
ERYTHROCYTE [DISTWIDTH] IN BLOOD BY AUTOMATED COUNT: 19.8 % (ref 11.5–17)
FERRITIN SERPL-MCNC: 6.83 NG/ML (ref 4.63–204)
GFR SERPLBLD CREATININE-BSD FMLA CKD-EPI: >60 ML/MIN/1.73/M2
GLOBULIN SER-MCNC: 4.2 GM/DL (ref 2.4–3.5)
GLUCOSE SERPL-MCNC: 104 MG/DL (ref 82–115)
GROUP & RH: NORMAL
HCT VFR BLD AUTO: 22.9 % (ref 37–47)
HGB BLD-MCNC: 5.8 G/DL (ref 12–16)
HOLD SPECIMEN: NORMAL
HYPOCHROMIA BLD QL SMEAR: ABNORMAL
IMM GRANULOCYTES # BLD AUTO: 0.03 X10(3)/MCL (ref 0–0.04)
IMM GRANULOCYTES NFR BLD AUTO: 0.4 %
INDIRECT COOMBS: NORMAL
IRON SATN MFR SERPL: 3 % (ref 20–50)
IRON SERPL-MCNC: 10 UG/DL (ref 50–170)
LYMPHOCYTES # BLD AUTO: 1.47 X10(3)/MCL (ref 0.6–4.6)
LYMPHOCYTES NFR BLD AUTO: 20.9 %
MCH RBC QN AUTO: 14.6 PG (ref 27–31)
MCHC RBC AUTO-ENTMCNC: 25.3 G/DL (ref 33–36)
MCV RBC AUTO: 57.7 FL (ref 80–94)
MICROCYTES BLD QL SMEAR: ABNORMAL
MONOCYTES # BLD AUTO: 1.01 X10(3)/MCL (ref 0.1–1.3)
MONOCYTES NFR BLD AUTO: 14.3 %
NEUTROPHILS # BLD AUTO: 4.18 X10(3)/MCL (ref 2.1–9.2)
NEUTROPHILS NFR BLD AUTO: 59.3 %
NRBC BLD AUTO-RTO: 0 %
NT-PROBNP SERPL-MCNC: 1522 PG/ML
PLATELET # BLD AUTO: 450 X10(3)/MCL (ref 130–400)
PLATELET # BLD EST: NORMAL 10*3/UL
PLATELETS.RETICULATED NFR BLD AUTO: 3.8 % (ref 0.9–11.2)
PMV BLD AUTO: 10 FL (ref 7.4–10.4)
POIKILOCYTOSIS BLD QL SMEAR: ABNORMAL
POTASSIUM SERPL-SCNC: 3.1 MMOL/L (ref 3.5–5.1)
PR INTERVAL: NORMAL
PROT SERPL-MCNC: 8 GM/DL (ref 5.8–7.6)
PRT AXES: NORMAL
QRS DURATION: NORMAL
QT/QTC: NORMAL
RBC # BLD AUTO: 3.97 X10(6)/MCL (ref 4.2–5.4)
RBC MORPH BLD: ABNORMAL
SODIUM SERPL-SCNC: 142 MMOL/L (ref 136–145)
SPECIMEN OUTDATE: NORMAL
TIBC SERPL-MCNC: 384 UG/DL (ref 70–310)
TIBC SERPL-MCNC: 394 UG/DL (ref 250–450)
TRANSFERRIN SERPL-MCNC: 383 MG/DL (ref 173–360)
TROPONIN I SERPL HS-MCNC: 9 NG/L
UNIT NUMBER: NORMAL
UNIT NUMBER: NORMAL
VENTRICULAR RATE: NORMAL
VIT B12 SERPL-MCNC: 586 PG/ML (ref 213–816)
WBC # BLD AUTO: 7.05 X10(3)/MCL (ref 4.5–11.5)

## 2025-08-15 PROCEDURE — 25000003 PHARM REV CODE 250

## 2025-08-15 PROCEDURE — 93005 ELECTROCARDIOGRAM TRACING: CPT

## 2025-08-15 PROCEDURE — 96376 TX/PRO/DX INJ SAME DRUG ADON: CPT

## 2025-08-15 PROCEDURE — P9016 RBC LEUKOCYTES REDUCED: HCPCS

## 2025-08-15 PROCEDURE — 82607 VITAMIN B-12: CPT

## 2025-08-15 PROCEDURE — 63600175 PHARM REV CODE 636 W HCPCS

## 2025-08-15 PROCEDURE — 36430 TRANSFUSION BLD/BLD COMPNT: CPT

## 2025-08-15 PROCEDURE — 80053 COMPREHEN METABOLIC PANEL: CPT | Performed by: INTERNAL MEDICINE

## 2025-08-15 PROCEDURE — 96374 THER/PROPH/DIAG INJ IV PUSH: CPT

## 2025-08-15 PROCEDURE — 82728 ASSAY OF FERRITIN: CPT

## 2025-08-15 PROCEDURE — G0378 HOSPITAL OBSERVATION PER HR: HCPCS

## 2025-08-15 PROCEDURE — 84484 ASSAY OF TROPONIN QUANT: CPT | Performed by: INTERNAL MEDICINE

## 2025-08-15 PROCEDURE — 85025 COMPLETE CBC W/AUTO DIFF WBC: CPT | Performed by: INTERNAL MEDICINE

## 2025-08-15 PROCEDURE — 86923 COMPATIBILITY TEST ELECTRIC: CPT

## 2025-08-15 PROCEDURE — 83540 ASSAY OF IRON: CPT

## 2025-08-15 PROCEDURE — 99285 EMERGENCY DEPT VISIT HI MDM: CPT | Mod: 25

## 2025-08-15 PROCEDURE — 83880 ASSAY OF NATRIURETIC PEPTIDE: CPT | Performed by: INTERNAL MEDICINE

## 2025-08-15 PROCEDURE — 86850 RBC ANTIBODY SCREEN: CPT

## 2025-08-15 RX ORDER — POTASSIUM CHLORIDE 7.45 MG/ML
10 INJECTION INTRAVENOUS
Status: COMPLETED | OUTPATIENT
Start: 2025-08-15 | End: 2025-08-16

## 2025-08-15 RX ORDER — PANTOPRAZOLE SODIUM 40 MG/1
40 TABLET, DELAYED RELEASE ORAL DAILY
Status: DISCONTINUED | OUTPATIENT
Start: 2025-08-16 | End: 2025-08-17 | Stop reason: HOSPADM

## 2025-08-15 RX ORDER — ACETAMINOPHEN 325 MG/1
650 TABLET ORAL EVERY 8 HOURS PRN
Status: DISCONTINUED | OUTPATIENT
Start: 2025-08-15 | End: 2025-08-17 | Stop reason: HOSPADM

## 2025-08-15 RX ORDER — HYDROCODONE BITARTRATE AND ACETAMINOPHEN 500; 5 MG/1; MG/1
TABLET ORAL
Status: DISCONTINUED | OUTPATIENT
Start: 2025-08-15 | End: 2025-08-17 | Stop reason: HOSPADM

## 2025-08-15 RX ORDER — SODIUM CHLORIDE 0.9 % (FLUSH) 0.9 %
10 SYRINGE (ML) INJECTION
Status: DISCONTINUED | OUTPATIENT
Start: 2025-08-15 | End: 2025-08-17 | Stop reason: HOSPADM

## 2025-08-15 RX ORDER — TALC
6 POWDER (GRAM) TOPICAL NIGHTLY PRN
Status: DISCONTINUED | OUTPATIENT
Start: 2025-08-15 | End: 2025-08-17 | Stop reason: HOSPADM

## 2025-08-15 RX ORDER — LISINOPRIL AND HYDROCHLOROTHIAZIDE 10; 12.5 MG/1; MG/1
1 TABLET ORAL DAILY
Status: DISCONTINUED | OUTPATIENT
Start: 2025-08-16 | End: 2025-08-17 | Stop reason: HOSPADM

## 2025-08-15 RX ORDER — SODIUM CHLORIDE 9 MG/ML
INJECTION, SOLUTION INTRAVENOUS CONTINUOUS
Status: DISCONTINUED | OUTPATIENT
Start: 2025-08-15 | End: 2025-08-15

## 2025-08-15 RX ADMIN — POTASSIUM BICARBONATE 50 MEQ: 977.5 TABLET, EFFERVESCENT ORAL at 09:08

## 2025-08-15 RX ADMIN — POTASSIUM CHLORIDE 10 MEQ: 7.46 INJECTION, SOLUTION INTRAVENOUS at 09:08

## 2025-08-15 RX ADMIN — POTASSIUM CHLORIDE 10 MEQ: 7.46 INJECTION, SOLUTION INTRAVENOUS at 10:08

## 2025-08-16 LAB
ALBUMIN SERPL-MCNC: 3.1 G/DL (ref 3.4–4.8)
ALBUMIN/GLOB SERPL: 0.9 RATIO (ref 1.1–2)
ALP SERPL-CCNC: 79 UNIT/L (ref 40–150)
ALT SERPL-CCNC: 9 UNIT/L (ref 0–55)
ANION GAP SERPL CALC-SCNC: 4 MEQ/L
AST SERPL-CCNC: 14 UNIT/L (ref 11–45)
BASOPHILS # BLD AUTO: 0.08 X10(3)/MCL
BASOPHILS NFR BLD AUTO: 1.2 %
BILIRUB SERPL-MCNC: 1 MG/DL
BUN SERPL-MCNC: 4.8 MG/DL (ref 9.8–20.1)
CALCIUM SERPL-MCNC: 8.2 MG/DL (ref 8.4–10.2)
CHLORIDE SERPL-SCNC: 110 MMOL/L (ref 98–107)
CO2 SERPL-SCNC: 26 MMOL/L (ref 23–31)
CREAT SERPL-MCNC: 0.61 MG/DL (ref 0.55–1.02)
CREAT/UREA NIT SERPL: 8
EOSINOPHIL # BLD AUTO: 0.15 X10(3)/MCL (ref 0–0.9)
EOSINOPHIL NFR BLD AUTO: 2.2 %
ERYTHROCYTE [DISTWIDTH] IN BLOOD BY AUTOMATED COUNT: 27.3 % (ref 11.5–17)
FOLATE SERPL-MCNC: 3.6 NG/ML (ref 7–31.4)
GFR SERPLBLD CREATININE-BSD FMLA CKD-EPI: >60 ML/MIN/1.73/M2
GLOBULIN SER-MCNC: 3.3 GM/DL (ref 2.4–3.5)
GLUCOSE SERPL-MCNC: 93 MG/DL (ref 82–115)
HCT VFR BLD AUTO: 29.4 % (ref 37–47)
HGB BLD-MCNC: 8.7 G/DL (ref 12–16)
IMM GRANULOCYTES # BLD AUTO: 0.06 X10(3)/MCL (ref 0–0.04)
IMM GRANULOCYTES NFR BLD AUTO: 0.9 %
LYMPHOCYTES # BLD AUTO: 1.06 X10(3)/MCL (ref 0.6–4.6)
LYMPHOCYTES NFR BLD AUTO: 15.5 %
MAGNESIUM SERPL-MCNC: 2.1 MG/DL (ref 1.6–2.6)
MCH RBC QN AUTO: 19.2 PG (ref 27–31)
MCHC RBC AUTO-ENTMCNC: 29.6 G/DL (ref 33–36)
MCV RBC AUTO: 65 FL (ref 80–94)
MONOCYTES # BLD AUTO: 0.81 X10(3)/MCL (ref 0.1–1.3)
MONOCYTES NFR BLD AUTO: 11.8 %
NEUTROPHILS # BLD AUTO: 4.68 X10(3)/MCL (ref 2.1–9.2)
NEUTROPHILS NFR BLD AUTO: 68.4 %
NRBC BLD AUTO-RTO: 0 %
PHOSPHATE SERPL-MCNC: 2.4 MG/DL (ref 2.3–4.7)
PLATELET # BLD AUTO: 339 X10(3)/MCL (ref 130–400)
PLATELETS.RETICULATED NFR BLD AUTO: 3.6 % (ref 0.9–11.2)
PMV BLD AUTO: 9.7 FL (ref 7.4–10.4)
POTASSIUM SERPL-SCNC: 4.2 MMOL/L (ref 3.5–5.1)
PROT SERPL-MCNC: 6.4 GM/DL (ref 5.8–7.6)
RBC # BLD AUTO: 4.52 X10(6)/MCL (ref 4.2–5.4)
RET# (OHS): 0.03 X10E6/UL (ref 0.02–0.08)
RETICULOCYTE COUNT AUTOMATED (OLG): 0.64 % (ref 1.1–2.1)
SODIUM SERPL-SCNC: 140 MMOL/L (ref 136–145)
WBC # BLD AUTO: 6.84 X10(3)/MCL (ref 4.5–11.5)

## 2025-08-16 PROCEDURE — 85045 AUTOMATED RETICULOCYTE COUNT: CPT

## 2025-08-16 PROCEDURE — 80053 COMPREHEN METABOLIC PANEL: CPT

## 2025-08-16 PROCEDURE — 36415 COLL VENOUS BLD VENIPUNCTURE: CPT

## 2025-08-16 PROCEDURE — 82746 ASSAY OF FOLIC ACID SERUM: CPT

## 2025-08-16 PROCEDURE — 84100 ASSAY OF PHOSPHORUS: CPT

## 2025-08-16 PROCEDURE — 63600175 PHARM REV CODE 636 W HCPCS

## 2025-08-16 PROCEDURE — 85025 COMPLETE CBC W/AUTO DIFF WBC: CPT

## 2025-08-16 PROCEDURE — 94761 N-INVAS EAR/PLS OXIMETRY MLT: CPT

## 2025-08-16 PROCEDURE — G0378 HOSPITAL OBSERVATION PER HR: HCPCS

## 2025-08-16 PROCEDURE — 25000003 PHARM REV CODE 250

## 2025-08-16 PROCEDURE — 83735 ASSAY OF MAGNESIUM: CPT

## 2025-08-16 PROCEDURE — 96376 TX/PRO/DX INJ SAME DRUG ADON: CPT

## 2025-08-16 PROCEDURE — 94760 N-INVAS EAR/PLS OXIMETRY 1: CPT

## 2025-08-16 RX ADMIN — POTASSIUM CHLORIDE 10 MEQ: 7.46 INJECTION, SOLUTION INTRAVENOUS at 12:08

## 2025-08-16 RX ADMIN — PANTOPRAZOLE SODIUM 40 MG: 40 TABLET, DELAYED RELEASE ORAL at 09:08

## 2025-08-16 RX ADMIN — LISINOPRIL AND HYDROCHLOROTHIAZIDE 1 TABLET: 12.5; 1 TABLET ORAL at 09:08

## 2025-08-17 VITALS
OXYGEN SATURATION: 98 % | SYSTOLIC BLOOD PRESSURE: 132 MMHG | RESPIRATION RATE: 18 BRPM | HEIGHT: 62 IN | TEMPERATURE: 98 F | BODY MASS INDEX: 19.44 KG/M2 | WEIGHT: 105.63 LBS | DIASTOLIC BLOOD PRESSURE: 71 MMHG | HEART RATE: 79 BPM

## 2025-08-17 LAB
ALBUMIN SERPL-MCNC: 3 G/DL (ref 3.4–4.8)
ALBUMIN/GLOB SERPL: 0.8 RATIO (ref 1.1–2)
ALP SERPL-CCNC: 82 UNIT/L (ref 40–150)
ALT SERPL-CCNC: 7 UNIT/L (ref 0–55)
ANION GAP SERPL CALC-SCNC: 8 MEQ/L
AST SERPL-CCNC: 11 UNIT/L (ref 11–45)
BASOPHILS # BLD AUTO: 0.09 X10(3)/MCL
BASOPHILS NFR BLD AUTO: 1.1 %
BILIRUB SERPL-MCNC: 1 MG/DL
BUN SERPL-MCNC: 14 MG/DL (ref 9.8–20.1)
CALCIUM SERPL-MCNC: 8.3 MG/DL (ref 8.4–10.2)
CHLORIDE SERPL-SCNC: 108 MMOL/L (ref 98–107)
CO2 SERPL-SCNC: 24 MMOL/L (ref 23–31)
CREAT SERPL-MCNC: 0.62 MG/DL (ref 0.55–1.02)
CREAT/UREA NIT SERPL: 23
EOSINOPHIL # BLD AUTO: 0.4 X10(3)/MCL (ref 0–0.9)
EOSINOPHIL NFR BLD AUTO: 4.9 %
ERYTHROCYTE [DISTWIDTH] IN BLOOD BY AUTOMATED COUNT: 28.4 % (ref 11.5–17)
GFR SERPLBLD CREATININE-BSD FMLA CKD-EPI: >60 ML/MIN/1.73/M2
GLOBULIN SER-MCNC: 3.6 GM/DL (ref 2.4–3.5)
GLUCOSE SERPL-MCNC: 88 MG/DL (ref 82–115)
HCT VFR BLD AUTO: 32.5 % (ref 37–47)
HGB BLD-MCNC: 9.6 G/DL (ref 12–16)
IMM GRANULOCYTES # BLD AUTO: 0.02 X10(3)/MCL (ref 0–0.04)
IMM GRANULOCYTES NFR BLD AUTO: 0.2 %
LYMPHOCYTES # BLD AUTO: 1.36 X10(3)/MCL (ref 0.6–4.6)
LYMPHOCYTES NFR BLD AUTO: 16.7 %
MAGNESIUM SERPL-MCNC: 2.1 MG/DL (ref 1.6–2.6)
MCH RBC QN AUTO: 19.2 PG (ref 27–31)
MCHC RBC AUTO-ENTMCNC: 29.5 G/DL (ref 33–36)
MCV RBC AUTO: 64.9 FL (ref 80–94)
MONOCYTES # BLD AUTO: 0.96 X10(3)/MCL (ref 0.1–1.3)
MONOCYTES NFR BLD AUTO: 11.8 %
NEUTROPHILS # BLD AUTO: 5.29 X10(3)/MCL (ref 2.1–9.2)
NEUTROPHILS NFR BLD AUTO: 65.3 %
NRBC BLD AUTO-RTO: 0 %
PHOSPHATE SERPL-MCNC: 3.9 MG/DL (ref 2.3–4.7)
PLATELET # BLD AUTO: 362 X10(3)/MCL (ref 130–400)
PLATELETS.RETICULATED NFR BLD AUTO: 3.7 % (ref 0.9–11.2)
PMV BLD AUTO: 9.8 FL (ref 7.4–10.4)
POTASSIUM SERPL-SCNC: 3.9 MMOL/L (ref 3.5–5.1)
PROT SERPL-MCNC: 6.6 GM/DL (ref 5.8–7.6)
RBC # BLD AUTO: 5.01 X10(6)/MCL (ref 4.2–5.4)
SODIUM SERPL-SCNC: 140 MMOL/L (ref 136–145)
WBC # BLD AUTO: 8.12 X10(3)/MCL (ref 4.5–11.5)

## 2025-08-17 PROCEDURE — 80053 COMPREHEN METABOLIC PANEL: CPT

## 2025-08-17 PROCEDURE — G0378 HOSPITAL OBSERVATION PER HR: HCPCS

## 2025-08-17 PROCEDURE — 36415 COLL VENOUS BLD VENIPUNCTURE: CPT

## 2025-08-17 PROCEDURE — 94761 N-INVAS EAR/PLS OXIMETRY MLT: CPT

## 2025-08-17 PROCEDURE — 83735 ASSAY OF MAGNESIUM: CPT

## 2025-08-17 PROCEDURE — 85025 COMPLETE CBC W/AUTO DIFF WBC: CPT

## 2025-08-17 PROCEDURE — 84100 ASSAY OF PHOSPHORUS: CPT

## 2025-08-17 PROCEDURE — 25000003 PHARM REV CODE 250

## 2025-08-17 RX ORDER — FERROUS SULFATE 325(65) MG
325 TABLET ORAL
Qty: 30 TABLET | Refills: 0 | Status: SHIPPED | OUTPATIENT
Start: 2025-08-17

## 2025-08-17 RX ADMIN — PANTOPRAZOLE SODIUM 40 MG: 40 TABLET, DELAYED RELEASE ORAL at 08:08

## 2025-08-17 RX ADMIN — LISINOPRIL AND HYDROCHLOROTHIAZIDE 1 TABLET: 12.5; 1 TABLET ORAL at 08:08

## 2025-08-18 ENCOUNTER — PATIENT OUTREACH (OUTPATIENT)
Dept: ADMINISTRATIVE | Facility: CLINIC | Age: 80
End: 2025-08-18
Payer: MEDICARE

## 2025-08-18 LAB
OHS QRS DURATION: 70 MS
OHS QTC CALCULATION: 471 MS

## 2025-09-05 ENCOUNTER — OFFICE VISIT (OUTPATIENT)
Dept: URGENT CARE | Facility: CLINIC | Age: 80
End: 2025-09-05
Payer: MEDICARE

## 2025-09-05 VITALS
SYSTOLIC BLOOD PRESSURE: 130 MMHG | HEIGHT: 62 IN | HEART RATE: 88 BPM | TEMPERATURE: 98 F | OXYGEN SATURATION: 98 % | RESPIRATION RATE: 18 BRPM | WEIGHT: 105 LBS | DIASTOLIC BLOOD PRESSURE: 82 MMHG | BODY MASS INDEX: 19.32 KG/M2

## 2025-09-05 DIAGNOSIS — T63.481A ALLERGIC REACTION TO INSECT STING, ACCIDENTAL OR UNINTENTIONAL, INITIAL ENCOUNTER: ICD-10-CM

## 2025-09-05 DIAGNOSIS — L30.9 DERMATITIS: ICD-10-CM

## 2025-09-05 DIAGNOSIS — T78.40XA ALLERGIC REACTION, INITIAL ENCOUNTER: Primary | ICD-10-CM

## 2025-09-05 RX ORDER — DEXAMETHASONE SODIUM PHOSPHATE 10 MG/ML
8 INJECTION INTRAMUSCULAR; INTRAVENOUS
Status: COMPLETED | OUTPATIENT
Start: 2025-09-05 | End: 2025-09-05

## 2025-09-05 RX ORDER — PREDNISONE 10 MG/1
10 TABLET ORAL 2 TIMES DAILY
Qty: 6 TABLET | Refills: 0 | Status: SHIPPED | OUTPATIENT
Start: 2025-09-06 | End: 2025-09-09

## 2025-09-05 RX ADMIN — DEXAMETHASONE SODIUM PHOSPHATE 8 MG: 10 INJECTION INTRAMUSCULAR; INTRAVENOUS at 08:09

## (undated) DEVICE — KIT SURGICAL COLON .25 1.1OZ

## (undated) DEVICE — MANIFOLD 4 PORT